# Patient Record
Sex: FEMALE | Race: BLACK OR AFRICAN AMERICAN | NOT HISPANIC OR LATINO | Employment: OTHER | URBAN - NONMETROPOLITAN AREA
[De-identification: names, ages, dates, MRNs, and addresses within clinical notes are randomized per-mention and may not be internally consistent; named-entity substitution may affect disease eponyms.]

---

## 2021-02-25 ENCOUNTER — HOSPITAL ENCOUNTER (EMERGENCY)
Facility: HOSPITAL | Age: 61
Discharge: PSYCHIATRIC OR SUBSTANCE USE FACILITY | End: 2021-02-25
Attending: EMERGENCY MEDICINE

## 2021-02-25 ENCOUNTER — HOSPITAL ENCOUNTER (INPATIENT)
Facility: HOSPITAL | Age: 61
LOS: 4 days | Discharge: HOME | End: 2021-03-01
Attending: PSYCHIATRY & NEUROLOGY | Admitting: PSYCHIATRY & NEUROLOGY

## 2021-02-25 VITALS
OXYGEN SATURATION: 100 % | WEIGHT: 293 LBS | DIASTOLIC BLOOD PRESSURE: 84 MMHG | RESPIRATION RATE: 18 BRPM | HEIGHT: 67 IN | HEART RATE: 82 BPM | BODY MASS INDEX: 45.99 KG/M2 | TEMPERATURE: 97.1 F | SYSTOLIC BLOOD PRESSURE: 160 MMHG

## 2021-02-25 DIAGNOSIS — R06.83 SNORING: Primary | ICD-10-CM

## 2021-02-25 DIAGNOSIS — R45.851 SUICIDAL IDEATION: Primary | ICD-10-CM

## 2021-02-25 DIAGNOSIS — R06.81 WITNESSED APNEIC SPELLS: ICD-10-CM

## 2021-02-25 DIAGNOSIS — F39 MOOD DISORDER (HCC): ICD-10-CM

## 2021-02-25 LAB
ALBUMIN SERPL-MCNC: 3.9 G/DL (ref 3.5–5.2)
ALBUMIN/GLOB SERPL: 1.3 G/DL
ALP SERPL-CCNC: 176 U/L (ref 39–117)
ALT SERPL W P-5'-P-CCNC: 10 U/L (ref 1–33)
AMPHET+METHAMPHET UR QL: NEGATIVE
AMPHETAMINES UR QL: NEGATIVE
ANION GAP SERPL CALCULATED.3IONS-SCNC: 9 MMOL/L (ref 5–15)
APAP SERPL-MCNC: <5 MCG/ML (ref 0–30)
AST SERPL-CCNC: 12 U/L (ref 1–32)
BARBITURATES UR QL SCN: NEGATIVE
BASOPHILS # BLD AUTO: 0.04 10*3/MM3 (ref 0–0.2)
BASOPHILS NFR BLD AUTO: 0.4 % (ref 0–1.5)
BENZODIAZ UR QL SCN: NEGATIVE
BILIRUB SERPL-MCNC: 0.2 MG/DL (ref 0–1.2)
BUN SERPL-MCNC: 8 MG/DL (ref 6–20)
BUN/CREAT SERPL: 9.2 (ref 7–25)
BUPRENORPHINE SERPL-MCNC: NEGATIVE NG/ML
CALCIUM SPEC-SCNC: 8.7 MG/DL (ref 8.6–10.5)
CANNABINOIDS SERPL QL: POSITIVE
CHLORIDE SERPL-SCNC: 107 MMOL/L (ref 98–107)
CO2 SERPL-SCNC: 26 MMOL/L (ref 22–29)
COCAINE UR QL: NEGATIVE
CREAT SERPL-MCNC: 0.87 MG/DL (ref 0.57–1)
DEPRECATED RDW RBC AUTO: 51.9 FL (ref 37–54)
EOSINOPHIL # BLD AUTO: 0.09 10*3/MM3 (ref 0–0.4)
EOSINOPHIL NFR BLD AUTO: 1 % (ref 0.3–6.2)
ERYTHROCYTE [DISTWIDTH] IN BLOOD BY AUTOMATED COUNT: 14.6 % (ref 12.3–15.4)
ETHANOL BLD-MCNC: <10 MG/DL (ref 0–10)
ETHANOL UR QL: <0.01 %
FLUAV RNA RESP QL NAA+PROBE: NOT DETECTED
FLUBV RNA RESP QL NAA+PROBE: NOT DETECTED
GFR SERPL CREATININE-BSD FRML MDRD: 81 ML/MIN/1.73
GLOBULIN UR ELPH-MCNC: 3.1 GM/DL
GLUCOSE SERPL-MCNC: 82 MG/DL (ref 65–99)
HCT VFR BLD AUTO: 43.2 % (ref 34–46.6)
HGB BLD-MCNC: 14.7 G/DL (ref 12–15.9)
HOLD SPECIMEN: NORMAL
HOLD SPECIMEN: NORMAL
IMM GRANULOCYTES # BLD AUTO: 0.01 10*3/MM3 (ref 0–0.05)
IMM GRANULOCYTES NFR BLD AUTO: 0.1 % (ref 0–0.5)
LYMPHOCYTES # BLD AUTO: 2.35 10*3/MM3 (ref 0.7–3.1)
LYMPHOCYTES NFR BLD AUTO: 26.4 % (ref 19.6–45.3)
MCH RBC QN AUTO: 32.8 PG (ref 26.6–33)
MCHC RBC AUTO-ENTMCNC: 34 G/DL (ref 31.5–35.7)
MCV RBC AUTO: 96.4 FL (ref 79–97)
METHADONE UR QL SCN: NEGATIVE
MONOCYTES # BLD AUTO: 0.57 10*3/MM3 (ref 0.1–0.9)
MONOCYTES NFR BLD AUTO: 6.4 % (ref 5–12)
NEUTROPHILS NFR BLD AUTO: 5.83 10*3/MM3 (ref 1.7–7)
NEUTROPHILS NFR BLD AUTO: 65.7 % (ref 42.7–76)
NRBC BLD AUTO-RTO: 0 /100 WBC (ref 0–0.2)
OPIATES UR QL: NEGATIVE
OXYCODONE UR QL SCN: NEGATIVE
PCP UR QL SCN: NEGATIVE
PLATELET # BLD AUTO: 220 10*3/MM3 (ref 140–450)
PMV BLD AUTO: 10.8 FL (ref 6–12)
POTASSIUM SERPL-SCNC: 3.7 MMOL/L (ref 3.5–5.2)
PROPOXYPH UR QL: NEGATIVE
PROT SERPL-MCNC: 7 G/DL (ref 6–8.5)
RBC # BLD AUTO: 4.48 10*6/MM3 (ref 3.77–5.28)
SALICYLATES SERPL-MCNC: <0.3 MG/DL
SARS-COV-2 RNA RESP QL NAA+PROBE: NOT DETECTED
SODIUM SERPL-SCNC: 142 MMOL/L (ref 136–145)
TRICYCLICS UR QL SCN: NEGATIVE
WBC # BLD AUTO: 8.89 10*3/MM3 (ref 3.4–10.8)
WHOLE BLOOD HOLD SPECIMEN: NORMAL
WHOLE BLOOD HOLD SPECIMEN: NORMAL

## 2021-02-25 PROCEDURE — 96372 THER/PROPH/DIAG INJ SC/IM: CPT

## 2021-02-25 PROCEDURE — 99283 EMERGENCY DEPT VISIT LOW MDM: CPT

## 2021-02-25 PROCEDURE — 25010000002 LORAZEPAM PER 2 MG: Performed by: EMERGENCY MEDICINE

## 2021-02-25 PROCEDURE — 80143 DRUG ASSAY ACETAMINOPHEN: CPT | Performed by: PHYSICIAN ASSISTANT

## 2021-02-25 PROCEDURE — 87636 SARSCOV2 & INF A&B AMP PRB: CPT | Performed by: PHYSICIAN ASSISTANT

## 2021-02-25 PROCEDURE — 25010000002 ZIPRASIDONE MESYLATE PER 10 MG: Performed by: PHYSICIAN ASSISTANT

## 2021-02-25 PROCEDURE — 80179 DRUG ASSAY SALICYLATE: CPT | Performed by: PHYSICIAN ASSISTANT

## 2021-02-25 PROCEDURE — 80053 COMPREHEN METABOLIC PANEL: CPT | Performed by: PHYSICIAN ASSISTANT

## 2021-02-25 PROCEDURE — 82077 ASSAY SPEC XCP UR&BREATH IA: CPT | Performed by: PHYSICIAN ASSISTANT

## 2021-02-25 PROCEDURE — 80306 DRUG TEST PRSMV INSTRMNT: CPT | Performed by: PHYSICIAN ASSISTANT

## 2021-02-25 PROCEDURE — 85025 COMPLETE CBC W/AUTO DIFF WBC: CPT | Performed by: PHYSICIAN ASSISTANT

## 2021-02-25 RX ORDER — AMLODIPINE BESYLATE 10 MG/1
10 TABLET ORAL DAILY
COMMUNITY

## 2021-02-25 RX ORDER — ONDANSETRON 4 MG/1
4 TABLET, ORALLY DISINTEGRATING ORAL EVERY 6 HOURS PRN
Status: DISCONTINUED | OUTPATIENT
Start: 2021-02-25 | End: 2021-03-01 | Stop reason: HOSPADM

## 2021-02-25 RX ORDER — ACETAMINOPHEN 325 MG/1
650 TABLET ORAL EVERY 4 HOURS PRN
Status: DISCONTINUED | OUTPATIENT
Start: 2021-02-25 | End: 2021-03-01 | Stop reason: HOSPADM

## 2021-02-25 RX ORDER — LOPERAMIDE HYDROCHLORIDE 2 MG/1
2 CAPSULE ORAL
Status: DISCONTINUED | OUTPATIENT
Start: 2021-02-25 | End: 2021-03-01 | Stop reason: HOSPADM

## 2021-02-25 RX ORDER — NICOTINE 21 MG/24HR
1 PATCH, TRANSDERMAL 24 HOURS TRANSDERMAL
Status: DISCONTINUED | OUTPATIENT
Start: 2021-02-26 | End: 2021-03-01 | Stop reason: HOSPADM

## 2021-02-25 RX ORDER — TRAZODONE HYDROCHLORIDE 50 MG/1
50 TABLET ORAL NIGHTLY PRN
Status: DISCONTINUED | OUTPATIENT
Start: 2021-02-25 | End: 2021-03-01 | Stop reason: HOSPADM

## 2021-02-25 RX ORDER — LORAZEPAM 2 MG/ML
1 INJECTION INTRAMUSCULAR ONCE
Status: COMPLETED | OUTPATIENT
Start: 2021-02-25 | End: 2021-02-25

## 2021-02-25 RX ORDER — CLONIDINE HYDROCHLORIDE 0.1 MG/1
0.1 TABLET ORAL EVERY 4 HOURS PRN
Status: DISCONTINUED | OUTPATIENT
Start: 2021-02-25 | End: 2021-03-01 | Stop reason: HOSPADM

## 2021-02-25 RX ORDER — ALUMINA, MAGNESIA, AND SIMETHICONE 2400; 2400; 240 MG/30ML; MG/30ML; MG/30ML
15 SUSPENSION ORAL EVERY 6 HOURS PRN
Status: DISCONTINUED | OUTPATIENT
Start: 2021-02-25 | End: 2021-03-01 | Stop reason: HOSPADM

## 2021-02-25 RX ORDER — ZIPRASIDONE MESYLATE 20 MG/ML
5 INJECTION, POWDER, LYOPHILIZED, FOR SOLUTION INTRAMUSCULAR ONCE
Status: COMPLETED | OUTPATIENT
Start: 2021-02-25 | End: 2021-02-25

## 2021-02-25 RX ADMIN — ZIPRASIDONE MESYLATE 5 MG: 20 INJECTION, POWDER, LYOPHILIZED, FOR SOLUTION INTRAMUSCULAR at 17:54

## 2021-02-25 RX ADMIN — LORAZEPAM 1 MG: 2 INJECTION INTRAMUSCULAR; INTRAVENOUS at 17:54

## 2021-02-26 LAB
CHOLEST SERPL-MCNC: 213 MG/DL (ref 0–200)
GLUCOSE P FAST SERPL-MCNC: 92 MG/DL (ref 74–106)
HDLC SERPL-MCNC: 34 MG/DL (ref 40–60)
LDLC SERPL CALC-MCNC: 155 MG/DL (ref 0–100)
LDLC/HDLC SERPL: 4.49 {RATIO}
TRIGL SERPL-MCNC: 131 MG/DL (ref 0–150)
VLDLC SERPL-MCNC: 24 MG/DL (ref 5–40)

## 2021-02-26 PROCEDURE — 82947 ASSAY GLUCOSE BLOOD QUANT: CPT | Performed by: PSYCHIATRY & NEUROLOGY

## 2021-02-26 PROCEDURE — 90833 PSYTX W PT W E/M 30 MIN: CPT | Performed by: PSYCHIATRY & NEUROLOGY

## 2021-02-26 PROCEDURE — 99223 1ST HOSP IP/OBS HIGH 75: CPT | Performed by: PSYCHIATRY & NEUROLOGY

## 2021-02-26 PROCEDURE — 80061 LIPID PANEL: CPT | Performed by: PSYCHIATRY & NEUROLOGY

## 2021-02-26 RX ORDER — AMLODIPINE BESYLATE 10 MG/1
10 TABLET ORAL EVERY EVENING
Status: DISCONTINUED | OUTPATIENT
Start: 2021-02-26 | End: 2021-03-01 | Stop reason: HOSPADM

## 2021-02-26 RX ADMIN — AMLODIPINE BESYLATE 10 MG: 10 TABLET ORAL at 20:40

## 2021-02-26 RX ADMIN — TRAZODONE HYDROCHLORIDE 50 MG: 50 TABLET ORAL at 23:03

## 2021-02-27 PROBLEM — I10 BENIGN ESSENTIAL HTN: Status: ACTIVE | Noted: 2021-02-27

## 2021-02-27 PROBLEM — F19.94 SUBSTANCE INDUCED MOOD DISORDER: Status: ACTIVE | Noted: 2021-02-27

## 2021-02-27 PROBLEM — F43.10 POST TRAUMATIC STRESS DISORDER (PTSD): Status: ACTIVE | Noted: 2021-02-27

## 2021-02-27 PROBLEM — F39 MOOD DISORDER: Status: ACTIVE | Noted: 2021-02-27

## 2021-02-27 PROBLEM — R45.89 INEFFECTIVE COPING: Status: ACTIVE | Noted: 2021-02-27

## 2021-02-27 PROBLEM — F12.20 TETRAHYDROCANNABINOL (THC) USE DISORDER, MODERATE, DEPENDENCE (HCC): Status: ACTIVE | Noted: 2021-02-27

## 2021-02-27 LAB
25(OH)D3 SERPL-MCNC: 9.9 NG/ML (ref 30–100)
FOLATE SERPL-MCNC: 4.62 NG/ML (ref 4.78–24.2)
TSH SERPL DL<=0.05 MIU/L-ACNC: 1.92 UIU/ML (ref 0.27–4.2)
VIT B12 BLD-MCNC: 242 PG/ML (ref 211–946)
WHOLE BLOOD HOLD SPECIMEN: NORMAL

## 2021-02-27 PROCEDURE — 82306 VITAMIN D 25 HYDROXY: CPT | Performed by: PSYCHIATRY & NEUROLOGY

## 2021-02-27 PROCEDURE — 82746 ASSAY OF FOLIC ACID SERUM: CPT | Performed by: PSYCHIATRY & NEUROLOGY

## 2021-02-27 PROCEDURE — 82607 VITAMIN B-12: CPT | Performed by: PSYCHIATRY & NEUROLOGY

## 2021-02-27 PROCEDURE — 90833 PSYTX W PT W E/M 30 MIN: CPT | Performed by: PSYCHIATRY & NEUROLOGY

## 2021-02-27 PROCEDURE — 99232 SBSQ HOSP IP/OBS MODERATE 35: CPT | Performed by: PSYCHIATRY & NEUROLOGY

## 2021-02-27 PROCEDURE — 84443 ASSAY THYROID STIM HORMONE: CPT | Performed by: PSYCHIATRY & NEUROLOGY

## 2021-02-27 RX ORDER — MIRTAZAPINE 15 MG/1
15 TABLET, FILM COATED ORAL NIGHTLY
Status: DISCONTINUED | OUTPATIENT
Start: 2021-02-27 | End: 2021-03-01 | Stop reason: HOSPADM

## 2021-02-27 RX ADMIN — AMLODIPINE BESYLATE 10 MG: 10 TABLET ORAL at 17:21

## 2021-02-27 RX ADMIN — MIRTAZAPINE 15 MG: 15 TABLET, FILM COATED ORAL at 20:31

## 2021-02-28 PROCEDURE — 99232 SBSQ HOSP IP/OBS MODERATE 35: CPT | Performed by: PSYCHIATRY & NEUROLOGY

## 2021-02-28 PROCEDURE — 90833 PSYTX W PT W E/M 30 MIN: CPT | Performed by: PSYCHIATRY & NEUROLOGY

## 2021-02-28 PROCEDURE — 25010000002 CYANOCOBALAMIN PER 1000 MCG: Performed by: PSYCHIATRY & NEUROLOGY

## 2021-02-28 RX ORDER — CHOLECALCIFEROL (VITAMIN D3) 1250 MCG
50000 CAPSULE ORAL ONCE
Status: COMPLETED | OUTPATIENT
Start: 2021-02-28 | End: 2021-02-28

## 2021-02-28 RX ORDER — FOLIC ACID 1 MG/1
1 TABLET ORAL DAILY
Status: DISCONTINUED | OUTPATIENT
Start: 2021-02-28 | End: 2021-03-01 | Stop reason: HOSPADM

## 2021-02-28 RX ORDER — CYANOCOBALAMIN 1000 UG/ML
1000 INJECTION, SOLUTION INTRAMUSCULAR; SUBCUTANEOUS DAILY
Status: COMPLETED | OUTPATIENT
Start: 2021-02-28 | End: 2021-03-01

## 2021-02-28 RX ORDER — LANOLIN ALCOHOL/MO/W.PET/CERES
1000 CREAM (GRAM) TOPICAL DAILY
Status: DISCONTINUED | OUTPATIENT
Start: 2021-03-02 | End: 2021-03-01 | Stop reason: HOSPADM

## 2021-02-28 RX ORDER — MELATONIN
2000 DAILY
Status: DISCONTINUED | OUTPATIENT
Start: 2021-03-01 | End: 2021-03-01

## 2021-02-28 RX ADMIN — CYANOCOBALAMIN 1000 MCG: 1000 INJECTION, SOLUTION INTRAMUSCULAR at 12:27

## 2021-02-28 RX ADMIN — AMLODIPINE BESYLATE 10 MG: 10 TABLET ORAL at 17:08

## 2021-02-28 RX ADMIN — FOLIC ACID 1 MG: 1 TABLET ORAL at 12:27

## 2021-02-28 RX ADMIN — MIRTAZAPINE 15 MG: 15 TABLET, FILM COATED ORAL at 20:19

## 2021-02-28 RX ADMIN — OFLOXACIN 50000 UNITS: 300 TABLET, COATED ORAL at 12:27

## 2021-03-01 VITALS
DIASTOLIC BLOOD PRESSURE: 74 MMHG | HEIGHT: 67 IN | BODY MASS INDEX: 45.99 KG/M2 | HEART RATE: 77 BPM | OXYGEN SATURATION: 98 % | RESPIRATION RATE: 17 BRPM | WEIGHT: 293 LBS | TEMPERATURE: 97.1 F | SYSTOLIC BLOOD PRESSURE: 154 MMHG

## 2021-03-01 PROBLEM — F19.94 SUBSTANCE INDUCED MOOD DISORDER: Status: RESOLVED | Noted: 2021-02-27 | Resolved: 2021-03-01

## 2021-03-01 PROBLEM — R45.851 SUICIDAL IDEATION: Status: RESOLVED | Noted: 2021-02-25 | Resolved: 2021-03-01

## 2021-03-01 PROCEDURE — 99239 HOSP IP/OBS DSCHRG MGMT >30: CPT | Performed by: PSYCHIATRY & NEUROLOGY

## 2021-03-01 PROCEDURE — 25010000002 CYANOCOBALAMIN PER 1000 MCG: Performed by: PSYCHIATRY & NEUROLOGY

## 2021-03-01 RX ORDER — CHOLECALCIFEROL (VITAMIN D3) 1250 MCG
50000 CAPSULE ORAL
Status: DISCONTINUED | OUTPATIENT
Start: 2021-03-07 | End: 2021-03-01 | Stop reason: HOSPADM

## 2021-03-01 RX ORDER — CHOLECALCIFEROL (VITAMIN D3) 1250 MCG
50000 CAPSULE ORAL
Qty: 5 CAPSULE | Refills: 1 | Status: SHIPPED | OUTPATIENT
Start: 2021-03-07 | End: 2022-05-09 | Stop reason: HOSPADM

## 2021-03-01 RX ORDER — MIRTAZAPINE 15 MG/1
15 TABLET, FILM COATED ORAL NIGHTLY
Qty: 30 TABLET | Refills: 1 | Status: SHIPPED | OUTPATIENT
Start: 2021-03-01 | End: 2022-05-09 | Stop reason: HOSPADM

## 2021-03-01 RX ORDER — FOLIC ACID 1 MG/1
1 TABLET ORAL DAILY
Qty: 30 TABLET | Refills: 1 | Status: SHIPPED | OUTPATIENT
Start: 2021-03-02 | End: 2022-05-09 | Stop reason: HOSPADM

## 2021-03-01 RX ADMIN — CYANOCOBALAMIN 1000 MCG: 1000 INJECTION, SOLUTION INTRAMUSCULAR at 08:21

## 2021-03-01 RX ADMIN — FOLIC ACID 1 MG: 1 TABLET ORAL at 08:20

## 2021-03-01 RX ADMIN — Medication 2000 UNITS: at 08:20

## 2021-03-01 NOTE — SIGNIFICANT NOTE
Undersigned met with Talya 1:1 this date to discuss discharge and safety planning. Talya reports feeling well about discharge and denies any concerns.She is apologetic about behaviors previous week. She was informed of follow up services at [facility] and oriented to open access services. Desires to continue services with current PCP- polyigned attempted to make contact with this facility and was unable to do so. Talya was instructed to follow up for care at provider desire upon d/c. Talya's  will be transporting her home this date. She appears stable to d/c at this time. Oriented to crisis hotline.
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
## 2022-05-09 ENCOUNTER — OFFICE VISIT (OUTPATIENT)
Dept: OBSTETRICS AND GYNECOLOGY | Facility: CLINIC | Age: 62
End: 2022-05-09

## 2022-05-09 ENCOUNTER — LAB (OUTPATIENT)
Dept: LAB | Facility: HOSPITAL | Age: 62
End: 2022-05-09

## 2022-05-09 VITALS
BODY MASS INDEX: 45.99 KG/M2 | WEIGHT: 293 LBS | SYSTOLIC BLOOD PRESSURE: 136 MMHG | DIASTOLIC BLOOD PRESSURE: 84 MMHG | HEIGHT: 67 IN

## 2022-05-09 DIAGNOSIS — N95.0 POST-MENOPAUSAL BLEEDING: Primary | ICD-10-CM

## 2022-05-09 DIAGNOSIS — Z32.00 PREGNANCY EXAMINATION OR TEST, PREGNANCY UNCONFIRMED: ICD-10-CM

## 2022-05-09 DIAGNOSIS — Z87.42 HISTORY OF ENDOMETRIAL HYPERPLASIA: ICD-10-CM

## 2022-05-09 LAB
B-HCG UR QL: NEGATIVE
EXPIRATION DATE: 0
INTERNAL NEGATIVE CONTROL: NEGATIVE
INTERNAL POSITIVE CONTROL: POSITIVE
Lab: 0

## 2022-05-09 PROCEDURE — 88305 TISSUE EXAM BY PATHOLOGIST: CPT

## 2022-05-09 PROCEDURE — 58100 BIOPSY OF UTERUS LINING: CPT | Performed by: NURSE PRACTITIONER

## 2022-05-09 PROCEDURE — 99203 OFFICE O/P NEW LOW 30 MIN: CPT | Performed by: NURSE PRACTITIONER

## 2022-05-09 PROCEDURE — 81025 URINE PREGNANCY TEST: CPT | Performed by: NURSE PRACTITIONER

## 2022-05-09 RX ORDER — LOSARTAN POTASSIUM 25 MG/1
25 TABLET ORAL DAILY
COMMUNITY
Start: 2022-03-31

## 2022-05-09 NOTE — PROGRESS NOTES
Subjective   Talya Schumacher is a 61 y.o. desires a hysterectomy    Talya Magaña is a 61 yr old postmenopausal . Referred by her PCP, Dr. Montanez for endometrial hyperplasia. Pt voices that she wants a hysterectomy because of the bleeding she has. Does not want to go to [facility] because she wants a female provider. She state she has an episode of spotting that occurs once a month although does not sure if occurs around the same time; mainly notices when she is wiping after using the restroom. States she had gone through menopause at age 51 then had spotting and vaginal bleeding from age [age] and was seen in [place] for this concern. Was told that it was related to her uterine fibroids and states she was given progesterone pills for the bleeding but pt this led to heavier bleeding and cramping. Was also seen at [facility] for vaginal bleeding in  but no records are available. [facility] Pt also states she had a Pelvic CT scan recently ordered by her PCP, but no records are available today and pt unsure of the results.       The following portions of the patient's history were reviewed and updated as appropriate: allergies, current medications, past family history, past medical history, past social history, past surgical history and problem list.    Review of Systems   Constitutional: Negative for chills, fatigue, fever, unexpected weight gain and unexpected weight loss.   Respiratory: Negative for shortness of breath.    Cardiovascular: Negative for chest pain and palpitations.   Gastrointestinal: Negative for abdominal pain, constipation, diarrhea and nausea.   Endocrine: Negative for cold intolerance and heat intolerance.   Genitourinary: Positive for vaginal bleeding (spotting). Negative for amenorrhea, breast discharge, breast lump, breast pain, difficulty urinating, dysuria, frequency, menstrual problem, pelvic pain, pelvic pressure, urinary  incontinence, vaginal discharge and vaginal pain.   Skin: Negative for rash.   Neurological: Negative for headache.   Psychiatric/Behavioral: Negative for sleep disturbance, depressed mood and stress.       Objective   Physical Exam  Vitals and nursing note reviewed. Exam conducted with a chaperone present.   Constitutional:       Appearance: Normal appearance. She is obese.   Pulmonary:      Effort: Pulmonary effort is normal.   Genitourinary:     General: Normal vulva.      Cervix: Normal.      Rectum: Normal.      Comments: EMB collected.  Neurological:      Mental Status: She is alert.   Psychiatric:         Mood and Affect: Mood normal.         Behavior: Behavior normal.           Assessment/Plan   Diagnoses and all orders for this visit:    1. Post-menopausal bleeding (Primary)  -     US Non-ob Transvaginal; Future  -     Cancel: Follicle Stimulating Hormone  -     Cancel: TISSUE EXAM, [facility] ([facility])  -     Tissue Pathology Exam    2. History of endometrial hyperplasia  -     Cancel: Follicle Stimulating Hormone  -     Cancel: TISSUE EXAM, [facility] ([facility])  -     Tissue Pathology Exam    3. Pregnancy examination or test, pregnancy unconfirmed  -     POC Pregnancy, Urine        Counseled pt on PMB work-up and recommend EMB to rule-out uterine hyperplasia and malignancy; call pt with results. Discussed recommendation for a pelvic U/S and surgical consultation with one of our physicians in [place] in 1-2 weeks. Requested previous records from her PCP and Sophie Garcia.         Endometrial Biopsy    Date of procedure:  5/9/2022    Procedure documentation:    The cervix was grasped anterior at the 1 o'clock position.  The cavity sounded to 9 centimeters.  An endometrial biopsy specimen was obtained. The tissue was sent for permanent histopathologic evaluation.  Tenaculum was removed from the cervix with scant bleeding. She tolerated the procedure well.    Post procedure instructions:She was  instructed to call us in 1 week's time if she has not heard from us otherwise.  If there is any significant fever or excessive bleeding or pain she is to call immediately.      This note was electronically signed.    Sarina Gudino, APRN  May 9, 2022

## 2022-05-11 LAB
LAB AP CASE REPORT: NORMAL
PATH REPORT.FINAL DX SPEC: NORMAL

## 2022-05-12 ENCOUNTER — TELEPHONE (OUTPATIENT)
Dept: OBSTETRICS AND GYNECOLOGY | Facility: CLINIC | Age: 62
End: 2022-05-12

## 2022-05-31 ENCOUNTER — OFFICE VISIT (OUTPATIENT)
Dept: OBSTETRICS AND GYNECOLOGY | Facility: CLINIC | Age: 62
End: 2022-05-31

## 2022-05-31 VITALS
DIASTOLIC BLOOD PRESSURE: 68 MMHG | WEIGHT: 293 LBS | SYSTOLIC BLOOD PRESSURE: 130 MMHG | BODY MASS INDEX: 45.99 KG/M2 | HEIGHT: 67 IN

## 2022-05-31 DIAGNOSIS — N95.0 PMB (POSTMENOPAUSAL BLEEDING): Primary | ICD-10-CM

## 2022-05-31 DIAGNOSIS — D25.9 UTERINE LEIOMYOMA, UNSPECIFIED LOCATION: ICD-10-CM

## 2022-05-31 DIAGNOSIS — R93.89 THICKENED ENDOMETRIUM: ICD-10-CM

## 2022-05-31 PROCEDURE — 99214 OFFICE O/P EST MOD 30 MIN: CPT | Performed by: STUDENT IN AN ORGANIZED HEALTH CARE EDUCATION/TRAINING PROGRAM

## 2022-05-31 RX ORDER — CEFAZOLIN SODIUM IN 0.9 % NACL 3 G/100 ML
3 INTRAVENOUS SOLUTION, PIGGYBACK (ML) INTRAVENOUS ONCE
Status: CANCELLED | OUTPATIENT
Start: 2022-07-07 | End: 2022-05-31

## 2022-05-31 RX ORDER — SODIUM CHLORIDE, SODIUM LACTATE, POTASSIUM CHLORIDE, CALCIUM CHLORIDE 600; 310; 30; 20 MG/100ML; MG/100ML; MG/100ML; MG/100ML
125 INJECTION, SOLUTION INTRAVENOUS CONTINUOUS
Status: CANCELLED | OUTPATIENT
Start: 2022-07-07

## 2022-06-01 PROBLEM — N95.0 PMB (POSTMENOPAUSAL BLEEDING): Status: ACTIVE | Noted: 2022-06-01

## 2022-07-05 ENCOUNTER — PRE-ADMISSION TESTING (OUTPATIENT)
Dept: PREADMISSION TESTING | Facility: HOSPITAL | Age: 62
End: 2022-07-05

## 2022-07-05 ENCOUNTER — LAB (OUTPATIENT)
Dept: LAB | Facility: HOSPITAL | Age: 62
End: 2022-07-05

## 2022-07-05 ENCOUNTER — OFFICE VISIT (OUTPATIENT)
Dept: OBSTETRICS AND GYNECOLOGY | Facility: CLINIC | Age: 62
End: 2022-07-05

## 2022-07-05 VITALS
BODY MASS INDEX: 45.99 KG/M2 | DIASTOLIC BLOOD PRESSURE: 76 MMHG | SYSTOLIC BLOOD PRESSURE: 132 MMHG | WEIGHT: 293 LBS | HEIGHT: 67 IN

## 2022-07-05 VITALS
HEART RATE: 88 BPM | BODY MASS INDEX: 45.99 KG/M2 | WEIGHT: 293 LBS | RESPIRATION RATE: 18 BRPM | OXYGEN SATURATION: 97 % | HEIGHT: 67 IN

## 2022-07-05 DIAGNOSIS — D25.9 UTERINE LEIOMYOMA, UNSPECIFIED LOCATION: ICD-10-CM

## 2022-07-05 DIAGNOSIS — N95.0 PMB (POSTMENOPAUSAL BLEEDING): Primary | ICD-10-CM

## 2022-07-05 DIAGNOSIS — N95.0 PMB (POSTMENOPAUSAL BLEEDING): ICD-10-CM

## 2022-07-05 LAB
ABO GROUP BLD: NORMAL
ANION GAP SERPL CALCULATED.3IONS-SCNC: 9 MMOL/L (ref 5–15)
BASOPHILS # BLD AUTO: 0.03 10*3/MM3 (ref 0–0.2)
BASOPHILS NFR BLD AUTO: 0.3 % (ref 0–1.5)
BLD GP AB SCN SERPL QL: NEGATIVE
BUN SERPL-MCNC: 7 MG/DL (ref 6–20)
BUN/CREAT SERPL: 8.3 (ref 7–25)
CALCIUM SPEC-SCNC: 9 MG/DL (ref 8.6–10.5)
CHLORIDE SERPL-SCNC: 105 MMOL/L (ref 98–107)
CO2 SERPL-SCNC: 25 MMOL/L (ref 22–29)
CREAT SERPL-MCNC: 0.84 MG/DL (ref 0.57–1)
DEPRECATED RDW RBC AUTO: 50.4 FL (ref 37–54)
EGFRCR SERPLBLD CKD-EPI 2021: 80.2 ML/MIN/1.73
EOSINOPHIL # BLD AUTO: 0.1 10*3/MM3 (ref 0–0.4)
EOSINOPHIL NFR BLD AUTO: 1 % (ref 0.3–6.2)
ERYTHROCYTE [DISTWIDTH] IN BLOOD BY AUTOMATED COUNT: 14.5 % (ref 12.3–15.4)
GLUCOSE SERPL-MCNC: 96 MG/DL (ref 65–99)
HCT VFR BLD AUTO: 40.2 % (ref 34–46.6)
HGB BLD-MCNC: 13.9 G/DL (ref 12–15.9)
IMM GRANULOCYTES # BLD AUTO: 0.04 10*3/MM3 (ref 0–0.05)
IMM GRANULOCYTES NFR BLD AUTO: 0.4 % (ref 0–0.5)
LYMPHOCYTES # BLD AUTO: 1.95 10*3/MM3 (ref 0.7–3.1)
LYMPHOCYTES NFR BLD AUTO: 19.2 % (ref 19.6–45.3)
Lab: NORMAL
MCH RBC QN AUTO: 32.5 PG (ref 26.6–33)
MCHC RBC AUTO-ENTMCNC: 34.6 G/DL (ref 31.5–35.7)
MCV RBC AUTO: 93.9 FL (ref 79–97)
MONOCYTES # BLD AUTO: 0.63 10*3/MM3 (ref 0.1–0.9)
MONOCYTES NFR BLD AUTO: 6.2 % (ref 5–12)
NEUTROPHILS NFR BLD AUTO: 7.38 10*3/MM3 (ref 1.7–7)
NEUTROPHILS NFR BLD AUTO: 72.9 % (ref 42.7–76)
NRBC BLD AUTO-RTO: 0 /100 WBC (ref 0–0.2)
PLATELET # BLD AUTO: 237 10*3/MM3 (ref 140–450)
PMV BLD AUTO: 11.3 FL (ref 6–12)
POTASSIUM SERPL-SCNC: 3.4 MMOL/L (ref 3.5–5.2)
RBC # BLD AUTO: 4.28 10*6/MM3 (ref 3.77–5.28)
RH BLD: POSITIVE
SARS-COV-2 N GENE RESP QL NAA+PROBE: NOT DETECTED
SODIUM SERPL-SCNC: 139 MMOL/L (ref 136–145)
T&S EXPIRATION DATE: NORMAL
WBC NRBC COR # BLD: 10.13 10*3/MM3 (ref 3.4–10.8)

## 2022-07-05 PROCEDURE — C9803 HOPD COVID-19 SPEC COLLECT: HCPCS

## 2022-07-05 PROCEDURE — 87635 SARS-COV-2 COVID-19 AMP PRB: CPT

## 2022-07-05 PROCEDURE — 86901 BLOOD TYPING SEROLOGIC RH(D): CPT

## 2022-07-05 PROCEDURE — 80048 BASIC METABOLIC PNL TOTAL CA: CPT

## 2022-07-05 PROCEDURE — 86900 BLOOD TYPING SEROLOGIC ABO: CPT

## 2022-07-05 PROCEDURE — 86850 RBC ANTIBODY SCREEN: CPT

## 2022-07-05 PROCEDURE — 93005 ELECTROCARDIOGRAM TRACING: CPT

## 2022-07-05 PROCEDURE — 36415 COLL VENOUS BLD VENIPUNCTURE: CPT

## 2022-07-05 PROCEDURE — 93010 ELECTROCARDIOGRAM REPORT: CPT | Performed by: INTERNAL MEDICINE

## 2022-07-05 PROCEDURE — 85025 COMPLETE CBC W/AUTO DIFF WBC: CPT

## 2022-07-05 PROCEDURE — 99214 OFFICE O/P EST MOD 30 MIN: CPT | Performed by: STUDENT IN AN ORGANIZED HEALTH CARE EDUCATION/TRAINING PROGRAM

## 2022-07-05 NOTE — H&P (VIEW-ONLY)
[facility]  HISTORY & PHYSICAL - Gynecology    Name: Talya Schumacher  MRN: [id number]  Location: Room/bed info not found  Date: 2022  CSN: [id number]      CHIEF COMPLAINT: Preop appointment    HISTORY OF PRESENT ILLNESS  Talya Schumacher is a 61 y.o.  postmenopausal female who presents with complaints of postmenopausal bleeding, desiring definitive management with hysterectomy with negative work-up.      Patient reports Menopause at 50 yo. Had spotting and bleeding from [age] yo. Seen in [place] and given progesterone for fibroids but states this made her bleeding heavier and had more cramping. States trialed vaginal and oral preparations and bled whole month. Reports seen  at [facility] but did not pursue additional treatment at that time.      Saw Sarina Ch, APRN 22. Had reported not wanting surgery with [facility] due to wanting female provider. Records requested.  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 developmental cystic remnant possible).     22 GYN US   IMPRESSION:  1. Thickened endometrium as above with history of postmenopausal  bleeding would warrant a follow-up endometrial biopsy.    2. No free fluid in the cul-de-sac.  3. Right ovary not identified on this exam.  4. Heterogeneous echotexture of the uterus without focal  parenchymal abnormality.     Says sees Dr. Montanez at [facility]. Will request records.    Denies any significant vaginal bleeding since she was last seen in the clinic.  Reports occasional pink spotting.  Denies any significant abdominal pain.    Patient denies any chest pain, palpitations, headaches, lightheadedness, shortness of breath, cough, nausea, vomiting, diarrhea, constipation, fever, or chills.     ROS  Review of Systems   Constitutional: Negative.    HENT: Negative.   "  Respiratory: Negative.    Cardiovascular: Negative.    Gastrointestinal: Negative.    Genitourinary: Positive for vaginal bleeding.   Musculoskeletal: Negative.    Skin: Negative.    Psychiatric/Behavioral: Negative.        OBSTETRIC HISTORY  OB History    Para Term  AB Living   10 6 6   4 7   SAB IAB Ectopic Molar Multiple Live Births           1 7      # Outcome Date GA Lbr Talha/2nd Weight Sex Delivery Anes PTL Lv   10 AB            9 AB            8 AB            7 AB            6A Term      Vag-Spont   SUSHMA   6B Term      Vag-Spont   SUSHMA   5 Term      Vag-Spont   SUSHMA   4 Term      Vag-Spont   SUSHMA   3 Term      Vag-Spont   SUSHMA   2 Term      Vag-Spont   SUSHMA   1 Term      Vag-Spont   SUSHMA     GYN HISTORY  Menopause 51  54 yo started bleeding again, sometimes spotting when wiping, sometimes cramping and bleeding like period  History of STIs: CT, s/p tx; states in hospital for one week on IV abx in   Last pap smear: [facility]       Abnormal pap smear history: ASCUS/hpv neg  Contraception: denies    PAST MEDICAL HISTORY  Past Medical History:   Diagnosis Date   • Class 3 severe obesity with body mass index (BMI) of 45.0 to 49.9 in adult (HCC)    • Hypertension      PAST SURGICAL HISTORY  Past Surgical History:   Procedure Laterality Date   • COLONOSCOPY     • DILATATION AND CURETTAGE      x3 surgical    • JOINT REPLACEMENT     • LAPAROSCOPIC CHOLECYSTECTOMY     • POSTPARTUM TUBAL LIGATION     • UMBILICAL HERNIA REPAIR      unsure mesh, \"gave me a belly button\", was child     FAMILY HISTORY  No family history on file.  SOCIAL HISTORY  Social History     Socioeconomic History   • Marital status:    Tobacco Use   • Smoking status: Current Every Day Smoker     Packs/day: 0.50     Types: Cigarettes   • Smokeless tobacco: Never Used   Substance and Sexual Activity   • Alcohol use: Yes     Comment: <1 drink per week   • Drug use: Yes     Types: Marijuana     Comment: NIGHTLY FOR SLEEP PER " PATIENT   • Sexual activity: Yes     Partners: Male     Birth control/protection: Surgical     ALLERGIES  Allergies   Allergen Reactions   • Contrast Dye Rash     HOME MEDICATIONS  Prior to Admission medications    Medication Sig Start Date End Date Taking? Authorizing Provider   amLODIPine (NORVASC) 10 MG tablet Take 10 mg by mouth Daily.    ProviderMarquis MD   losartan (COZAAR) 25 MG tablet Take 25 mg by mouth Daily. 3/31/22   ProviderMarquis MD     PHYSICAL EXAM  /76  BMI 46    General: No acute distress.  Well nourished.  Alert and oriented x 3.  Heart:  RRR, no murmurs, rubs, or gallops  Lungs: CTAB.  No wheezes, rales, or rhonchi.  Abdomen: Soft. Nontender.  No rebound or guarding.  5/31/22 PELVIC EXAM:  External Genitalia/Vulva: Anatomy is normal, no significant redness of labia, no discharge on vulvar tissues, Allouez's and Bartholin's glands are normal, no ulcers, no condylomatous lesions. Postmenopausal atrophy without lesions or other skin changes.  Urethral meatus: Normal, no lesions, no prolapse.  Urethra: Normal, no masses, no tenderness with palpation.  Bladder: Normal, no fullness, no masses, no tenderness with palpation.  Vagina: Vaginal tissues are not inflamed, normal color and texture, no significant discharge present.  Pelvic support adequate.  Cervix: Normal, no lesions, no purulent discharge, no cervical motion tenderness.  Uterus: Normal size, shape, and consistency.  Good mobility noted.  Minimal descent noted with good support.  Adnexa: Normal size and shape bilaterally, no palpable mass bilaterally and non-tender bilaterally.  Rectal: NERIS deferred.    LABS  WBC   Date Value Ref Range Status   07/05/2022 10.13 3.40 - 10.80 10*3/mm3 Final     RBC   Date Value Ref Range Status   07/05/2022 4.28 3.77 - 5.28 10*6/mm3 Final     Hemoglobin   Date Value Ref Range Status   07/05/2022 13.9 12.0 - 15.9 g/dL Final     Hematocrit   Date Value Ref Range Status   07/05/2022 40.2 34.0 -  46.6 % Final     MCV   Date Value Ref Range Status   07/05/2022 93.9 79.0 - 97.0 fL Final     MCH   Date Value Ref Range Status   07/05/2022 32.5 26.6 - 33.0 pg Final     MCHC   Date Value Ref Range Status   07/05/2022 34.6 31.5 - 35.7 g/dL Final     RDW   Date Value Ref Range Status   07/05/2022 14.5 12.3 - 15.4 % Final     RDW-SD   Date Value Ref Range Status   07/05/2022 50.4 37.0 - 54.0 fl Final     MPV   Date Value Ref Range Status   07/05/2022 11.3 6.0 - 12.0 fL Final     Platelets   Date Value Ref Range Status   07/05/2022 237 140 - 450 10*3/mm3 Final     Neutrophil %   Date Value Ref Range Status   07/05/2022 72.9 42.7 - 76.0 % Final     Lymphocyte %   Date Value Ref Range Status   07/05/2022 19.2 (L) 19.6 - 45.3 % Final     Monocyte %   Date Value Ref Range Status   07/05/2022 6.2 5.0 - 12.0 % Final     Eosinophil %   Date Value Ref Range Status   07/05/2022 1.0 0.3 - 6.2 % Final     Basophil %   Date Value Ref Range Status   07/05/2022 0.3 0.0 - 1.5 % Final     Immature Grans %   Date Value Ref Range Status   07/05/2022 0.4 0.0 - 0.5 % Final     Neutrophils, Absolute   Date Value Ref Range Status   07/05/2022 7.38 (H) 1.70 - 7.00 10*3/mm3 Final     Lymphocytes, Absolute   Date Value Ref Range Status   07/05/2022 1.95 0.70 - 3.10 10*3/mm3 Final     Monocytes, Absolute   Date Value Ref Range Status   07/05/2022 0.63 0.10 - 0.90 10*3/mm3 Final     Eosinophils, Absolute   Date Value Ref Range Status   07/05/2022 0.10 0.00 - 0.40 10*3/mm3 Final     Basophils, Absolute   Date Value Ref Range Status   07/05/2022 0.03 0.00 - 0.20 10*3/mm3 Final     Immature Grans, Absolute   Date Value Ref Range Status   07/05/2022 0.04 0.00 - 0.05 10*3/mm3 Final     nRBC   Date Value Ref Range Status   07/05/2022 0.0 0.0 - 0.2 /100 WBC Final     Lab Results   Component Value Date    GLUCOSE 96 07/05/2022    BUN 7 07/05/2022    CREATININE 0.84 07/05/2022    EGFRIFAFRI 81 02/25/2021    BCR 8.3 07/05/2022    K 3.4 (L) 07/05/2022     CO2 25.0 2022    CALCIUM 9.0 2022    ALBUMIN 3.90 2021    AST 12 2021    ALT 10 2021         IMAGING  22 Uterus 8.09 x 4.28 x 5.09 cm, ES 6.7 mm, heterogeoneous uterus, probable small fibroids, ROV not visualized, LOV 1.75 x 1.59 x 1.52 cm  US Non-ob Transvaginal  1. Thickened endometrium as above with history of postmenopausal bleeding would warrant a follow-up endometrial biopsy.  2. No free fluid in the cul-de-sac. 3. Right ovary not identified on this exam. 4. Heterogeneous echotexture of the uterus without focal parenchymal abnormality.  Electronically signed by:  Sylvester Mejia MD  2022 10:58 AM CDT Workstation: [id number]      IMPRESSION  Talya Schumacher is a 61 y.o.  presenting with desire for definitive management of PMB suspected related to AUB-L, failed medical management with oral progesterone.    PLAN  1. PMB (postmenopausal bleeding)  - Intermittent PMB, suspect due to fibroid uterus discussed previously  - EMB negative: discussed previously with patient risk of occult atypia/hyperplasia/malignancy as EMB with 45% sensitivity for disease, 100% sensitivity for cancer; 98-99% specificity; discussed could perform Hsc D&C for further sampling vs. Plan for hysterectomy and BSO  - Discussed options include progesterone therapy +/- Hsc (reports failing Progesterone therapy previously), Hsc D&C and IUD placement, endometrial ablation, or hysterectomy and r/b of each  - Patient strongly desires definitive management with hysterectomy  - Discussed with morbid obesity there is risk of needing to perform HUDSON if anesthesia unable to ventilate with Trendelenburg positioning  - Would plan for entry at [place] due to prior umbilical hernia repair  - Will plan for TLH, BSO, Cysto, possible HUDSON if bleeding, scar tissue, ventilation issues or other surgical indication procludes laparoscopic procedure  - Discussed DC home POD#0 if uncomplicated TLH, typically POD#1-2 if  HUDSON pending recovery  - Discussed risk of worsening LAURI after hysterectomy and offered Urology referral, but patient declines at this time  - Discussed risks/benefits of surgery: risk of infection, bleeding, damage to surrounding structures, perforation, need for additional procedures; discussed risk of anesthesia including heart attack, stroke, and death; required COVID screening and plan for outpatient surgery discussed.  - Reviewed plan of care, surgical plan again today and patient desires to proceed  - Records from [facility] reviewed          This document has been electronically signed by Lucia Golden DO on July 5, 2022 10:44 CDT

## 2022-07-05 NOTE — PROGRESS NOTES
[facility]  HISTORY & PHYSICAL - Gynecology    Name: Talya Schumacher  MRN: [id number]  Location: Room/bed info not found  Date: 2022  CSN: [id number]      CHIEF COMPLAINT: Preop appointment    HISTORY OF PRESENT ILLNESS  Talya Schumacher is a 61 y.o.  postmenopausal female who presents with complaints of postmenopausal bleeding, desiring definitive management with hysterectomy with negative work-up.      Patient reports Menopause at 50 yo. Had spotting and bleeding from [age] yo. Seen in [place] and given progesterone for fibroids but states this made her bleeding heavier and had more cramping. States trialed vaginal and oral preparations and bled whole month. Reports seen  at [facility] but did not pursue additional treatment at that time.      Saw Sarina Ch, APRN 22. Had reported not wanting surgery with [facility] due to wanting female provider. Records requested.  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 developmental cystic remnant possible).     22 GYN US   IMPRESSION:  1. Thickened endometrium as above with history of postmenopausal  bleeding would warrant a follow-up endometrial biopsy.    2. No free fluid in the cul-de-sac.  3. Right ovary not identified on this exam.  4. Heterogeneous echotexture of the uterus without focal  parenchymal abnormality.     [facility] Will [facility] records.    Denies any significant vaginal bleeding since she was last seen in the clinic.  Reports occasional pink spotting.  Denies any significant abdominal pain.    Patient denies any chest pain, palpitations, headaches, lightheadedness, shortness of breath, cough, nausea, vomiting, diarrhea, constipation, fever, or chills.     ROS  Review of Systems   Constitutional: Negative.    HENT: Negative.   "  Respiratory: Negative.    Cardiovascular: Negative.    Gastrointestinal: Negative.    Genitourinary: Positive for vaginal bleeding.   Musculoskeletal: Negative.    Skin: Negative.    Psychiatric/Behavioral: Negative.        OBSTETRIC HISTORY  OB History    Para Term  AB Living   10 6 6   4 7   SAB IAB Ectopic Molar Multiple Live Births           1 7      # Outcome Date GA Lbr Talha/2nd Weight Sex Delivery Anes PTL Lv   10 AB            9 AB            8 AB            7 AB            6A Term      Vag-Spont   SUSHMA   6B Term      Vag-Spont   SUSHMA   5 Term      Vag-Spont   SUSHMA   4 Term      Vag-Spont   SUSHMA   3 Term      Vag-Spont   SUSHMA   2 Term      Vag-Spont   SUSHMA   1 Term      Vag-Spont   SUSHMA     GYN HISTORY  Menopause 51  54 yo started bleeding again, sometimes spotting when wiping, sometimes cramping and bleeding like period  History of STIs: CT, s/p tx; states in hospital for one week on IV abx in   Last pap smear: [facility]       Abnormal pap smear history: ASCUS/hpv neg  Contraception: denies    PAST MEDICAL HISTORY  Past Medical History:   Diagnosis Date   • Class 3 severe obesity with body mass index (BMI) of 45.0 to 49.9 in adult (HCC)    • Hypertension      PAST SURGICAL HISTORY  Past Surgical History:   Procedure Laterality Date   • COLONOSCOPY     • DILATATION AND CURETTAGE      x3 surgical    • JOINT REPLACEMENT     • LAPAROSCOPIC CHOLECYSTECTOMY     • POSTPARTUM TUBAL LIGATION     • UMBILICAL HERNIA REPAIR      unsure mesh, \"gave me a belly button\", was child     FAMILY HISTORY  No family history on file.  SOCIAL HISTORY  Social History     Socioeconomic History   • Marital status:    Tobacco Use   • Smoking status: Current Every Day Smoker     Packs/day: 0.50     Types: Cigarettes   • Smokeless tobacco: Never Used   Substance and Sexual Activity   • Alcohol use: Yes     Comment: <1 drink per week   • Drug use: Yes     Types: Marijuana     Comment: NIGHTLY FOR SLEEP PER " PATIENT   • Sexual activity: Yes     Partners: Male     Birth control/protection: Surgical     ALLERGIES  Allergies   Allergen Reactions   • Contrast Dye Rash     HOME MEDICATIONS  Prior to Admission medications    Medication Sig Start Date End Date Taking? Authorizing Provider   amLODIPine (NORVASC) 10 MG tablet Take 10 mg by mouth Daily.    ProviderMarquis MD   losartan (COZAAR) 25 MG tablet Take 25 mg by mouth Daily. 3/31/22   ProviderMarquis MD     PHYSICAL EXAM  /76  BMI 46    General: No acute distress.  Well nourished.  Alert and oriented x 3.  Heart:  RRR, no murmurs, rubs, or gallops  Lungs: CTAB.  No wheezes, rales, or rhonchi.  Abdomen: Soft. Nontender.  No rebound or guarding.  5/31/22 PELVIC EXAM:  External Genitalia/Vulva: Anatomy is normal, no significant redness of labia, no discharge on vulvar tissues, [place]'s and Bartholin's glands are normal, no ulcers, no condylomatous lesions. Postmenopausal atrophy without lesions or other skin changes.  Urethral meatus: Normal, no lesions, no prolapse.  Urethra: Normal, no masses, no tenderness with palpation.  Bladder: Normal, no fullness, no masses, no tenderness with palpation.  Vagina: Vaginal tissues are not inflamed, normal color and texture, no significant discharge present.  Pelvic support adequate.  Cervix: Normal, no lesions, no purulent discharge, no cervical motion tenderness.  Uterus: Normal size, shape, and consistency.  Good mobility noted.  Minimal descent noted with good support.  Adnexa: Normal size and shape bilaterally, no palpable mass bilaterally and non-tender bilaterally.  Rectal: NERIS deferred.    LABS  WBC   Date Value Ref Range Status   07/05/2022 10.13 3.40 - 10.80 10*3/mm3 Final     RBC   Date Value Ref Range Status   07/05/2022 4.28 3.77 - 5.28 10*6/mm3 Final     Hemoglobin   Date Value Ref Range Status   07/05/2022 13.9 12.0 - 15.9 g/dL Final     Hematocrit   Date Value Ref Range Status   07/05/2022 40.2 34.0 -  46.6 % Final     MCV   Date Value Ref Range Status   07/05/2022 93.9 79.0 - 97.0 fL Final     MCH   Date Value Ref Range Status   07/05/2022 32.5 26.6 - 33.0 pg Final     MCHC   Date Value Ref Range Status   07/05/2022 34.6 31.5 - 35.7 g/dL Final     RDW   Date Value Ref Range Status   07/05/2022 14.5 12.3 - 15.4 % Final     RDW-SD   Date Value Ref Range Status   07/05/2022 50.4 37.0 - 54.0 fl Final     MPV   Date Value Ref Range Status   07/05/2022 11.3 6.0 - 12.0 fL Final     Platelets   Date Value Ref Range Status   07/05/2022 237 140 - 450 10*3/mm3 Final     Neutrophil %   Date Value Ref Range Status   07/05/2022 72.9 42.7 - 76.0 % Final     Lymphocyte %   Date Value Ref Range Status   07/05/2022 19.2 (L) 19.6 - 45.3 % Final     Monocyte %   Date Value Ref Range Status   07/05/2022 6.2 5.0 - 12.0 % Final     Eosinophil %   Date Value Ref Range Status   07/05/2022 1.0 0.3 - 6.2 % Final     Basophil %   Date Value Ref Range Status   07/05/2022 0.3 0.0 - 1.5 % Final     Immature Grans %   Date Value Ref Range Status   07/05/2022 0.4 0.0 - 0.5 % Final     Neutrophils, Absolute   Date Value Ref Range Status   07/05/2022 7.38 (H) 1.70 - 7.00 10*3/mm3 Final     Lymphocytes, Absolute   Date Value Ref Range Status   07/05/2022 1.95 0.70 - 3.10 10*3/mm3 Final     Monocytes, Absolute   Date Value Ref Range Status   07/05/2022 0.63 0.10 - 0.90 10*3/mm3 Final     Eosinophils, Absolute   Date Value Ref Range Status   07/05/2022 0.10 0.00 - 0.40 10*3/mm3 Final     Basophils, Absolute   Date Value Ref Range Status   07/05/2022 0.03 0.00 - 0.20 10*3/mm3 Final     Immature Grans, Absolute   Date Value Ref Range Status   07/05/2022 0.04 0.00 - 0.05 10*3/mm3 Final     nRBC   Date Value Ref Range Status   07/05/2022 0.0 0.0 - 0.2 /100 WBC Final     Lab Results   Component Value Date    GLUCOSE 96 07/05/2022    BUN 7 07/05/2022    CREATININE 0.84 07/05/2022    EGFRIFAFRI 81 02/25/2021    BCR 8.3 07/05/2022    K 3.4 (L) 07/05/2022     CO2 25.0 2022    CALCIUM 9.0 2022    ALBUMIN 3.90 2021    AST 12 2021    ALT 10 2021         IMAGING  22 Uterus 8.09 x 4.28 x 5.09 cm, ES 6.7 mm, heterogeoneous uterus, probable small fibroids, ROV not visualized, LOV 1.75 x 1.59 x 1.52 cm  US Non-ob Transvaginal  1. Thickened endometrium as above with history of postmenopausal bleeding would warrant a follow-up endometrial biopsy.  2. No free fluid in the cul-de-sac. 3. Right ovary not identified on this exam. 4. Heterogeneous echotexture of the uterus without focal parenchymal abnormality.  Electronically signed by:  Sylvester Mejia MD  2022 10:58 AM CDT Workstation: [id number]      IMPRESSION  Talya Schumacher is a 61 y.o.  presenting with desire for definitive management of PMB suspected related to AUB-L, failed medical management with oral progesterone.    PLAN  1. PMB (postmenopausal bleeding)  - Intermittent PMB, suspect due to fibroid uterus discussed previously  - EMB negative: discussed previously with patient risk of occult atypia/hyperplasia/malignancy as EMB with 45% sensitivity for disease, 100% sensitivity for cancer; 98-99% specificity; discussed could perform Hsc D&C for further sampling vs. Plan for hysterectomy and BSO  - Discussed options include progesterone therapy +/- Hsc (reports failing Progesterone therapy previously), Hsc D&C and IUD placement, endometrial ablation, or hysterectomy and r/b of each  - Patient strongly desires definitive management with hysterectomy  - Discussed with morbid obesity there is risk of needing to perform HUDSON if anesthesia unable to ventilate with Trendelenburg positioning  - Would plan for entry at Morris's point due to prior umbilical hernia repair  - Will plan for TLH, BSO, Cysto, possible HUDSON if bleeding, scar tissue, ventilation issues or other surgical indication procludes laparoscopic procedure  - Discussed DC home POD#0 if uncomplicated TLH, typically POD#1-2 if  HUDSON pending recovery  - Discussed risk of worsening LAURI after hysterectomy and offered Urology referral, but patient declines at this time  - Discussed risks/benefits of surgery: risk of infection, bleeding, damage to surrounding structures, perforation, need for additional procedures; discussed risk of anesthesia including heart attack, stroke, and death; required COVID screening and plan for outpatient surgery discussed.  - Reviewed plan of care, surgical plan again today and patient desires to proceed  - Records from [facility] reviewed          This document has been electronically signed by Lucia Golden DO on July 5, 2022 10:44 CDT

## 2022-07-07 ENCOUNTER — HOSPITAL ENCOUNTER (OUTPATIENT)
Facility: HOSPITAL | Age: 62
Setting detail: HOSPITAL OUTPATIENT SURGERY
Discharge: HOME | End: 2022-07-07
Attending: STUDENT IN AN ORGANIZED HEALTH CARE EDUCATION/TRAINING PROGRAM | Admitting: STUDENT IN AN ORGANIZED HEALTH CARE EDUCATION/TRAINING PROGRAM

## 2022-07-07 ENCOUNTER — ANESTHESIA (OUTPATIENT)
Dept: PERIOP | Facility: HOSPITAL | Age: 62
End: 2022-07-07

## 2022-07-07 ENCOUNTER — ANESTHESIA EVENT (OUTPATIENT)
Dept: PERIOP | Facility: HOSPITAL | Age: 62
End: 2022-07-07

## 2022-07-07 VITALS
OXYGEN SATURATION: 97 % | SYSTOLIC BLOOD PRESSURE: 173 MMHG | HEIGHT: 67 IN | DIASTOLIC BLOOD PRESSURE: 78 MMHG | RESPIRATION RATE: 18 BRPM | BODY MASS INDEX: 45.99 KG/M2 | HEART RATE: 71 BPM | WEIGHT: 293 LBS | TEMPERATURE: 96.5 F

## 2022-07-07 DIAGNOSIS — Z90.721 STATUS POST HYSTERECTOMY WITH OOPHORECTOMY: Primary | ICD-10-CM

## 2022-07-07 DIAGNOSIS — Z90.710 STATUS POST HYSTERECTOMY WITH OOPHORECTOMY: Primary | ICD-10-CM

## 2022-07-07 DIAGNOSIS — N95.0 PMB (POSTMENOPAUSAL BLEEDING): ICD-10-CM

## 2022-07-07 LAB
ABO GROUP BLD: NORMAL
AMPHET+METHAMPHET UR QL: NEGATIVE
AMPHETAMINES UR QL: NEGATIVE
B-HCG UR QL: NEGATIVE
BARBITURATES UR QL SCN: NEGATIVE
BENZODIAZ UR QL SCN: NEGATIVE
BLD GP AB SCN SERPL QL: NEGATIVE
BUPRENORPHINE SERPL-MCNC: NEGATIVE NG/ML
CANNABINOIDS SERPL QL: POSITIVE
COCAINE UR QL: NEGATIVE
Lab: NORMAL
METHADONE UR QL SCN: NEGATIVE
OPIATES UR QL: NEGATIVE
OXYCODONE UR QL SCN: NEGATIVE
PCP UR QL SCN: NEGATIVE
PROPOXYPH UR QL: NEGATIVE
RH BLD: POSITIVE
T&S EXPIRATION DATE: NORMAL
TRICYCLICS UR QL SCN: NEGATIVE

## 2022-07-07 PROCEDURE — 86850 RBC ANTIBODY SCREEN: CPT | Performed by: ANESTHESIOLOGY

## 2022-07-07 PROCEDURE — 25010000002 SUCCINYLCHOLINE PER 20 MG: Performed by: NURSE ANESTHETIST, CERTIFIED REGISTERED

## 2022-07-07 PROCEDURE — 0 LIDOCAINE 1 % SOLUTION: Performed by: NURSE ANESTHETIST, CERTIFIED REGISTERED

## 2022-07-07 PROCEDURE — 25010000002 HYDROMORPHONE 1 MG/ML SOLUTION: Performed by: NURSE ANESTHETIST, CERTIFIED REGISTERED

## 2022-07-07 PROCEDURE — 25010000002 CEFAZOLIN PER 500 MG: Performed by: STUDENT IN AN ORGANIZED HEALTH CARE EDUCATION/TRAINING PROGRAM

## 2022-07-07 PROCEDURE — 58571 TLH W/T/O 250 G OR LESS: CPT

## 2022-07-07 PROCEDURE — 25010000002 ONDANSETRON PER 1 MG: Performed by: NURSE ANESTHETIST, CERTIFIED REGISTERED

## 2022-07-07 PROCEDURE — 25010000002 PROPOFOL 10 MG/ML EMULSION: Performed by: NURSE ANESTHETIST, CERTIFIED REGISTERED

## 2022-07-07 PROCEDURE — 80306 DRUG TEST PRSMV INSTRMNT: CPT | Performed by: ANESTHESIOLOGY

## 2022-07-07 PROCEDURE — 58571 TLH W/T/O 250 G OR LESS: CPT | Performed by: STUDENT IN AN ORGANIZED HEALTH CARE EDUCATION/TRAINING PROGRAM

## 2022-07-07 PROCEDURE — 25010000002 NEOSTIGMINE 10 MG/10ML SOLUTION: Performed by: NURSE ANESTHETIST, CERTIFIED REGISTERED

## 2022-07-07 PROCEDURE — 86900 BLOOD TYPING SEROLOGIC ABO: CPT | Performed by: ANESTHESIOLOGY

## 2022-07-07 PROCEDURE — 81025 URINE PREGNANCY TEST: CPT | Performed by: STUDENT IN AN ORGANIZED HEALTH CARE EDUCATION/TRAINING PROGRAM

## 2022-07-07 PROCEDURE — 25010000002 MIDAZOLAM PER 1 MG: Performed by: NURSE ANESTHETIST, CERTIFIED REGISTERED

## 2022-07-07 PROCEDURE — 25010000002 FENTANYL CITRATE (PF) 50 MCG/ML SOLUTION: Performed by: NURSE ANESTHETIST, CERTIFIED REGISTERED

## 2022-07-07 PROCEDURE — 94640 AIRWAY INHALATION TREATMENT: CPT

## 2022-07-07 PROCEDURE — 86901 BLOOD TYPING SEROLOGIC RH(D): CPT | Performed by: ANESTHESIOLOGY

## 2022-07-07 PROCEDURE — 88307 TISSUE EXAM BY PATHOLOGIST: CPT

## 2022-07-07 DEVICE — FLOSEAL HEMOSTATIC MATRIX, 10ML
Type: IMPLANTABLE DEVICE | Site: ABDOMEN | Status: FUNCTIONAL
Brand: FLOSEAL HEMOSTATIC MATRIX

## 2022-07-07 RX ORDER — SODIUM CHLORIDE, SODIUM LACTATE, POTASSIUM CHLORIDE, CALCIUM CHLORIDE 600; 310; 30; 20 MG/100ML; MG/100ML; MG/100ML; MG/100ML
125 INJECTION, SOLUTION INTRAVENOUS CONTINUOUS
Status: DISCONTINUED | OUTPATIENT
Start: 2022-07-07 | End: 2022-07-07 | Stop reason: HOSPADM

## 2022-07-07 RX ORDER — OXYCODONE HYDROCHLORIDE 5 MG/1
5 TABLET ORAL EVERY 4 HOURS PRN
Qty: 12 TABLET | Refills: 0 | Status: SHIPPED | OUTPATIENT
Start: 2022-07-07

## 2022-07-07 RX ORDER — PROMETHAZINE HYDROCHLORIDE 25 MG/1
25 TABLET ORAL ONCE AS NEEDED
Status: DISCONTINUED | OUTPATIENT
Start: 2022-07-07 | End: 2022-07-07 | Stop reason: HOSPADM

## 2022-07-07 RX ORDER — PROPOFOL 10 MG/ML
VIAL (ML) INTRAVENOUS AS NEEDED
Status: DISCONTINUED | OUTPATIENT
Start: 2022-07-07 | End: 2022-07-07 | Stop reason: SURG

## 2022-07-07 RX ORDER — DOCUSATE SODIUM 100 MG/1
100 CAPSULE, LIQUID FILLED ORAL 2 TIMES DAILY
Qty: 60 CAPSULE | Refills: 1 | Status: SHIPPED | OUTPATIENT
Start: 2022-07-07

## 2022-07-07 RX ORDER — VECURONIUM BROMIDE 1 MG/ML
INJECTION, POWDER, LYOPHILIZED, FOR SOLUTION INTRAVENOUS AS NEEDED
Status: DISCONTINUED | OUTPATIENT
Start: 2022-07-07 | End: 2022-07-07 | Stop reason: SURG

## 2022-07-07 RX ORDER — NALOXONE HCL 0.4 MG/ML
0.4 VIAL (ML) INJECTION AS NEEDED
Status: DISCONTINUED | OUTPATIENT
Start: 2022-07-07 | End: 2022-07-07 | Stop reason: HOSPADM

## 2022-07-07 RX ORDER — SODIUM CHLORIDE, SODIUM GLUCONATE, SODIUM ACETATE, POTASSIUM CHLORIDE AND MAGNESIUM CHLORIDE 526; 502; 368; 37; 30 MG/100ML; MG/100ML; MG/100ML; MG/100ML; MG/100ML
INJECTION, SOLUTION INTRAVENOUS CONTINUOUS PRN
Status: DISCONTINUED | OUTPATIENT
Start: 2022-07-07 | End: 2022-07-07 | Stop reason: SURG

## 2022-07-07 RX ORDER — DIPHENHYDRAMINE HYDROCHLORIDE 50 MG/ML
12.5 INJECTION INTRAMUSCULAR; INTRAVENOUS
Status: DISCONTINUED | OUTPATIENT
Start: 2022-07-07 | End: 2022-07-07 | Stop reason: HOSPADM

## 2022-07-07 RX ORDER — LIDOCAINE HYDROCHLORIDE 10 MG/ML
INJECTION, SOLUTION INFILTRATION; PERINEURAL AS NEEDED
Status: DISCONTINUED | OUTPATIENT
Start: 2022-07-07 | End: 2022-07-07

## 2022-07-07 RX ORDER — BUPIVACAINE HYDROCHLORIDE 2.5 MG/ML
INJECTION, SOLUTION EPIDURAL; INFILTRATION; INTRACAUDAL AS NEEDED
Status: DISCONTINUED | OUTPATIENT
Start: 2022-07-07 | End: 2022-07-07 | Stop reason: HOSPADM

## 2022-07-07 RX ORDER — EPHEDRINE SULFATE 50 MG/ML
5 INJECTION, SOLUTION INTRAVENOUS ONCE AS NEEDED
Status: DISCONTINUED | OUTPATIENT
Start: 2022-07-07 | End: 2022-07-07 | Stop reason: HOSPADM

## 2022-07-07 RX ORDER — CEFAZOLIN SODIUM IN 0.9 % NACL 3 G/100 ML
3 INTRAVENOUS SOLUTION, PIGGYBACK (ML) INTRAVENOUS ONCE
Status: COMPLETED | OUTPATIENT
Start: 2022-07-07 | End: 2022-07-07

## 2022-07-07 RX ORDER — NEOSTIGMINE METHYLSULFATE 1 MG/ML
INJECTION, SOLUTION INTRAVENOUS AS NEEDED
Status: DISCONTINUED | OUTPATIENT
Start: 2022-07-07 | End: 2022-07-07 | Stop reason: SURG

## 2022-07-07 RX ORDER — FENTANYL CITRATE 50 UG/ML
INJECTION, SOLUTION INTRAMUSCULAR; INTRAVENOUS AS NEEDED
Status: DISCONTINUED | OUTPATIENT
Start: 2022-07-07 | End: 2022-07-07 | Stop reason: SURG

## 2022-07-07 RX ORDER — ACETAMINOPHEN 650 MG/1
650 SUPPOSITORY RECTAL ONCE AS NEEDED
Status: DISCONTINUED | OUTPATIENT
Start: 2022-07-07 | End: 2022-07-07 | Stop reason: HOSPADM

## 2022-07-07 RX ORDER — ALBUTEROL SULFATE 2.5 MG/3ML
2.5 SOLUTION RESPIRATORY (INHALATION) ONCE
Status: COMPLETED | OUTPATIENT
Start: 2022-07-07 | End: 2022-07-07

## 2022-07-07 RX ORDER — SUCCINYLCHOLINE CHLORIDE 20 MG/ML
INJECTION INTRAMUSCULAR; INTRAVENOUS AS NEEDED
Status: DISCONTINUED | OUTPATIENT
Start: 2022-07-07 | End: 2022-07-07 | Stop reason: SURG

## 2022-07-07 RX ORDER — IBUPROFEN 800 MG/1
800 TABLET ORAL EVERY 6 HOURS PRN
Qty: 40 TABLET | Refills: 1 | Status: SHIPPED | OUTPATIENT
Start: 2022-07-07

## 2022-07-07 RX ORDER — FLUMAZENIL 0.1 MG/ML
0.2 INJECTION INTRAVENOUS AS NEEDED
Status: DISCONTINUED | OUTPATIENT
Start: 2022-07-07 | End: 2022-07-07 | Stop reason: HOSPADM

## 2022-07-07 RX ORDER — ACETAMINOPHEN 325 MG/1
650 TABLET ORAL EVERY 4 HOURS PRN
Qty: 100 TABLET | Refills: 2 | Status: SHIPPED | OUTPATIENT
Start: 2022-07-07 | End: 2023-07-07

## 2022-07-07 RX ORDER — LIDOCAINE HYDROCHLORIDE 10 MG/ML
INJECTION, SOLUTION INFILTRATION; PERINEURAL AS NEEDED
Status: DISCONTINUED | OUTPATIENT
Start: 2022-07-07 | End: 2022-07-07 | Stop reason: SURG

## 2022-07-07 RX ORDER — ONDANSETRON 4 MG/1
4 TABLET, FILM COATED ORAL DAILY PRN
Qty: 30 TABLET | Refills: 1 | Status: SHIPPED | OUTPATIENT
Start: 2022-07-07 | End: 2023-07-07

## 2022-07-07 RX ORDER — ONDANSETRON 2 MG/ML
INJECTION INTRAMUSCULAR; INTRAVENOUS AS NEEDED
Status: DISCONTINUED | OUTPATIENT
Start: 2022-07-07 | End: 2022-07-07 | Stop reason: SURG

## 2022-07-07 RX ORDER — ACETAMINOPHEN 325 MG/1
650 TABLET ORAL ONCE AS NEEDED
Status: DISCONTINUED | OUTPATIENT
Start: 2022-07-07 | End: 2022-07-07 | Stop reason: HOSPADM

## 2022-07-07 RX ORDER — MEPERIDINE HYDROCHLORIDE 25 MG/ML
12.5 INJECTION INTRAMUSCULAR; INTRAVENOUS; SUBCUTANEOUS
Status: DISCONTINUED | OUTPATIENT
Start: 2022-07-07 | End: 2022-07-07 | Stop reason: HOSPADM

## 2022-07-07 RX ORDER — MIDAZOLAM HYDROCHLORIDE 1 MG/ML
INJECTION INTRAMUSCULAR; INTRAVENOUS AS NEEDED
Status: DISCONTINUED | OUTPATIENT
Start: 2022-07-07 | End: 2022-07-07 | Stop reason: SURG

## 2022-07-07 RX ORDER — PROMETHAZINE HYDROCHLORIDE 25 MG/1
25 SUPPOSITORY RECTAL ONCE AS NEEDED
Status: DISCONTINUED | OUTPATIENT
Start: 2022-07-07 | End: 2022-07-07 | Stop reason: HOSPADM

## 2022-07-07 RX ORDER — ONDANSETRON 2 MG/ML
4 INJECTION INTRAMUSCULAR; INTRAVENOUS ONCE AS NEEDED
Status: DISCONTINUED | OUTPATIENT
Start: 2022-07-07 | End: 2022-07-07 | Stop reason: HOSPADM

## 2022-07-07 RX ADMIN — FENTANYL CITRATE 50 MCG: 50 INJECTION INTRAMUSCULAR; INTRAVENOUS at 08:20

## 2022-07-07 RX ADMIN — MIDAZOLAM HYDROCHLORIDE 2 MG: 1 INJECTION, SOLUTION INTRAMUSCULAR; INTRAVENOUS at 08:12

## 2022-07-07 RX ADMIN — GLYCOPYRROLATE 0.6 MG: 0.2 INJECTION, SOLUTION INTRAMUSCULAR; INTRAVITREAL at 11:50

## 2022-07-07 RX ADMIN — VECURONIUM BROMIDE 6 MG: 1 INJECTION, POWDER, LYOPHILIZED, FOR SOLUTION INTRAVENOUS at 08:41

## 2022-07-07 RX ADMIN — LIDOCAINE HYDROCHLORIDE 50 MG: 10 INJECTION, SOLUTION INFILTRATION; PERINEURAL at 08:20

## 2022-07-07 RX ADMIN — ONDANSETRON 4 MG: 2 INJECTION INTRAMUSCULAR; INTRAVENOUS at 11:49

## 2022-07-07 RX ADMIN — HYDROMORPHONE HYDROCHLORIDE 0.5 MG: 1 INJECTION, SOLUTION INTRAMUSCULAR; INTRAVENOUS; SUBCUTANEOUS at 12:45

## 2022-07-07 RX ADMIN — FENTANYL CITRATE 50 MCG: 50 INJECTION INTRAMUSCULAR; INTRAVENOUS at 08:54

## 2022-07-07 RX ADMIN — SODIUM CHLORIDE, POTASSIUM CHLORIDE, SODIUM LACTATE AND CALCIUM CHLORIDE 125 ML/HR: 600; 310; 30; 20 INJECTION, SOLUTION INTRAVENOUS at 07:27

## 2022-07-07 RX ADMIN — Medication 3 G: at 08:37

## 2022-07-07 RX ADMIN — NEOSTIGMINE METHYLSULFATE 3 MG: 0.5 INJECTION INTRAVENOUS at 11:50

## 2022-07-07 RX ADMIN — ALBUTEROL SULFATE 2.5 MG: 2.5 SOLUTION RESPIRATORY (INHALATION) at 07:27

## 2022-07-07 RX ADMIN — VECURONIUM BROMIDE 1 MG: 1 INJECTION, POWDER, LYOPHILIZED, FOR SOLUTION INTRAVENOUS at 08:20

## 2022-07-07 RX ADMIN — SODIUM CHLORIDE, SODIUM GLUCONATE, SODIUM ACETATE, POTASSIUM CHLORIDE AND MAGNESIUM CHLORIDE: 526; 502; 368; 37; 30 INJECTION, SOLUTION INTRAVENOUS at 11:55

## 2022-07-07 RX ADMIN — PROPOFOL 200 MG: 10 INJECTION, EMULSION INTRAVENOUS at 08:20

## 2022-07-07 RX ADMIN — FENTANYL CITRATE 50 MCG: 50 INJECTION INTRAMUSCULAR; INTRAVENOUS at 10:05

## 2022-07-07 RX ADMIN — SUCCINYLCHOLINE CHLORIDE 120 MG: 20 INJECTION, SOLUTION INTRAMUSCULAR; INTRAVENOUS at 08:20

## 2022-07-07 NOTE — INTERVAL H&P NOTE
"61 y.o.  presenting with desire for definitive management of PMB suspected related to AUB-L, failed medical management with oral progesterone.  Patient ready to proceed with TLH, BSO, cysto, possible HUDSON. Discussed surgical plan again in detail and patient understands and desires oophorectomy with procedure due to postmenopausal status.    Vitals:    22 0717   BP: 137/78   BP Location: Right arm   Patient Position: Lying   Pulse: 77   Resp: 20   Temp: 97 °F (36.1 °C)   TempSrc: Temporal   SpO2: 98%   Weight: 134 kg (294 lb 5 oz)   Height: 170.2 cm (67\")     Gen: well appearing, NAD  CV: RRR  Chest: no increased work of breathing  Abd: nontender    61 y.o.  presenting with desire for definitive management of PMB suspected related to AUB-L, failed medical management with oral progesterone.  To OR for TLH, BSO, cysto, possible HUDSON if unable to adequately ventilate.  Patient late to same day surgery but checked in and ready to proceed to OR  "

## 2022-07-07 NOTE — ANESTHESIA POSTPROCEDURE EVALUATION
Patient: Talya Schumacher    Procedure Summary     Date: 07/07/22 Room / Location: [facility] OR [address] [place] [address] OR    Anesthesia Start: 0813 Anesthesia Stop: 1259    Procedure: TOTAL LAPAROSCOPIC HYSTERECTOMY, BILATERAL SALPINGO OOPHORECTOMY, CYSTOSCOPY (N/A Abdomen) Diagnosis:       PMB (postmenopausal bleeding)      (PMB (postmenopausal bleeding) [N95.0])    Surgeons: Lucia Golden DO Provider: Canelo Dan MD    Anesthesia Type: general ASA Status: 3          Anesthesia Type: general    Vitals  Vitals Value Taken Time   /68 07/07/22 1255   Temp 99.6 °F (37.6 °C) 07/07/22 1210   Pulse 72 07/07/22 1255   Resp 16 07/07/22 1255   SpO2 97 % 07/07/22 1255           Post Anesthesia Care and Evaluation    Patient location during evaluation: PACU  Patient participation: complete - patient participated  Level of consciousness: sleepy but conscious  Pain score: 0  Pain management: adequate    Airway patency: patent  Anesthetic complications: No anesthetic complications  PONV Status: none  Cardiovascular status: acceptable  Respiratory status: acceptable  Hydration status: acceptable    Comments: --------------------            07/07/22               1255     --------------------   BP:       148/68     Pulse:      72       Resp:       16       Temp:                SpO2:      97%      --------------------

## 2022-07-07 NOTE — ANESTHESIA PROCEDURE NOTES
Airway  Urgency: elective    Date/Time: 7/7/2022 8:23 AM  Airway not difficult    General Information and Staff    Patient location during procedure: OR  CRNA/CAA: Cosme Kerr CRNA    Indications and Patient Condition  Indications for airway management: airway protection    Preoxygenated: yes  Mask difficulty assessment: 0 - not attempted    Final Airway Details  Final airway type: endotracheal airway      Successful airway: ETT  Cuffed: yes   Successful intubation technique: direct laryngoscopy  Facilitating devices/methods: intubating stylet  Endotracheal tube insertion site: oral  Blade: Ramirez  Blade size: 4  ETT size (mm): 8.0  Cormack-Lehane Classification: grade I - full view of glottis  Placement verified by: chest auscultation and capnometry   Measured from: lips  ETT/EBT  to lips (cm): 20  Number of attempts at approach: 1  Assessment: lips, teeth, and gum same as pre-op and atraumatic intubation

## 2022-07-07 NOTE — OP NOTE
[facility]  Operative Report    Name: Talya Schumacher  MRN: [id number]  Date: 7/7/2022  CSN: [id number]      Location: [facility]    Service: Gynecology    Pre-op Diagnosis: persistent postmenopausal bleeding with failed medical management    Post-op Diagnosis: same    Surgeon: Lucia Golden DO    Assistant: Ximena Cao CST was responsible for performing the following activities: Retraction, Suction, Irrigation, Suturing, Closing, Placing Dressing and Held/Positioned Camera and their skilled assistance was necessary for the success of this case.    Joie Garcia CST was responsible for performing the following activities: uterine manipulation, Suturing, Closing and their skilled assistance was necessary for the success of this case.    Staff:  Circulator: Brittanie Reyes RN; Alix aVladez RN  Scrub Person: Barby Young  Assistant: Joie Garcia CSA; Ximena Cao CSFA    Anesthesia: General    Anesthesia Staff:  Anesthesiologist: Canelo Dan MD  CRNA: Cosme Kerr CRNA    Operation: Total laparoscopic hysterectomy, bilateral salpingo-oophorectomy, cystoscopy, lysis of adhesions (> 30 minutes)    Drains: adames removed prior to end of surgery    Complications: none    Findings: Normal-appearing uterus, cervix, bilateral fallopian tubes, bilateral ovaries.  Ovaries are atrophic consistent with menopausal state.  Normal-appearing bladder mucosa with urinary sediment, strong jets from bilateral ureters, no bladder injuries.  Significant scarring of the omentum to the umbilicus at site of prior hernia repair, small adhesion from the peritoneum overlying the bladder to the umbilicus, likely remnant noted on prior CT scan.    Condition: stable    Specimens/Disposition: uterus, cervix, bilateral fallopian tubes and ovaries    Estimated Blood Loss: 50 mL  IV Fluids: see anesthesia documentation  Urine Output: see anesthesia documentation    Indications: Talya Schumacher, 61  y.o.  presenting with desire for definitive management of PMB suspected related to AUB-L, failed medical management with oral progesterone.    Description of Operation:  After appropriate consents were obtained, the patient was taken to the operating room.  The patient was identified and the procedure verified.  The patient was given general endotracheal anesthesia and placed in the dorsal lithotomy position.  She was draped, prepped in the usual sterile manner. Time out was performed and procedure verified.      Martini catheter was placed.  Vaginal retractors were placed into the vagina.  A single toothed tenaculum was used to grasp the anterior lip of the cervix.  A HOTEL Top-Level Domain uterine manipulator with medium Koh cup and 8-tip was placed through the cervix into the uterus.     Attention was then turned towards the abdomen.  An incision was made in the left upper quadrant at Morris's point after injecting local anesthesia.  A Veress needle was placed in the abdomen was insufflated establishing pneumoperitoneum with initial pressure less than 8 mmHg.the trocar was then introduced with the scope evidencing appropriate entry into the abdominal cavity as well as significant scarring from the omentum to the umbilicus.  The scope was inserted again and visualized no injury to underlying bowel or tissues.  The above findings were noted.  A 5 mm incision was made lateral to the umbilicus after injecting local anesthesia on the right side through which a 5 mm trocar and sleeve were passed through under direct visualization without difficulty.  An 11 mm incision was made lateral to the umbilicus on the left side after injecting local anesthesia, through which am 11 mm trocar and sleeve were passed through under direct visualization.    The adhesions from the omentum to the umbilicus were slowly taken down in layers to ensure no underlying bowel was present.the remnant from the bladder to the umbilicus was then taken down at  the level of the umbilicus through a clear adhesion.  An inferior umbilical incision was then made after injection of local anesthesia through which a 5 mm trocar was placed under direct visualization.    The uterus was then visualized and the patient was placed in Trendelenburg positioning.  The left ovary and tube were grasped and retracted anteriorly and medially away from the pelvic sidewall.  The infundibulopelvic ligament, followed by the uteroovarian ligament, and then the proximal fallopian tube were then sealed and divided using Enseal.  This freed the left ovary, and distal portion of the fallopian tube from the pelvis.  The specimen was then placed in the cul-de-sac and removed at the time of removal of the uterus through the vagina.  Excision site was inspected and found to be hemostatic.       The Enseal was then used to clamp, cauterize, and cut the round ligament on the left. The anterior broad ligament was taken down to begin creation of the bladder flap on the left. Attention was then turned towards the right side of the uterus. The Enseal was then used to clamp, cauterize, and cut the right round ligament.  The Enseal was then used to clamp, cauterize, and cut the right uteroovarian ligament. Careful attention was given as the bladder flap was then created using blunt and sharp dissection with the Enseal.  Once the bladder was completely dissected off the uterus, the Enseal was used to clamp, cauterize, and cut the left uterine artery.  The left cardinal ligament was then clamped cauterized, and cut.  In like fashion on the right the uterine artery was skeletonized, cauterized, clamped, and cut.  The right cardinal ligament was clamped, cauterized, and cut.  The uterus and cervix were then amputated from the vagina using the L-hook scalpel.  The vaginal cuff was closed with endostitch.  Upon throwing the final left lateral stitch of the vaginal cuff incision, it was noted that the Endo Stitch  needle had broken in half with half of the needle still attached to the Endo Stitch device.  The device was carefully removed and inspected.  We then attempted to visualize the retained end of the Endo Stitch needle.  After careful inspection and attempted palpation through the vagina and with an instrument through the abdomen decision was made to open the cuff slightly at the left most aspect.  The cuff stitch was cut just proximal to this area and removed.  Upon opening the left end of the vaginal cuff, the retained needle was noted and carefully removed with a needle .   It was inspected upon removal and found to be the complete remnant of the broken needle.  A new Endo Stitch was opened and used to close the open area of the cuff from the left towards the right.  Approximately midway across the vaginal cuff, it was noted that this Endo Stitch needle also broke in half.  The Endo Stitch device was removed with half of the needle attached.  The retained portion of the needle was noted to be protruding from the vaginal cuff and was carefully removed with a needle .  It was noted that the entire needle had been removed.  All needles and Endo Stitch devices were collected to be sent to the  for evaluation due to device malfunction.  Hemostasis was noted from the vaginal cuff.  Suction irrigation was then performed.  Looking laparoscopically, good hemostasis was noted at this time from the vaginal cuff and pedicles.  Surgicel was placed over the vaginal cuff for additional prophylactic hemostasis.  The left lower quadrant side port was removed under direct visualization and the Humza-Caroline device used to close the fascia using Vicryl stitch. It was visualized and palpated and found to be well closed. The right lower quadrant and umbilical ports were then removed under direct visualization.  The intraabdominal carbon dioxide was allowed to escape.  The skin incisions were closed with 3-0  Monocryl.  A cystoscopy was performed demonstrating bilateral spill from both ureteral orifices, a bubble at the dome of the bladder and no suture seen within the bladder. Sponge, needle, and instrument counts were correct x2.  There were no intraoperative complications, and patient tolerated the procedure well.  She was extubated and escorted to the recovery room in stable condition.    The patient received Cefazolin 3g for antibiotic prophylaxis prior to the start of the procedure.        This document has been electronically signed by Lucia Golden DO on July 7, 2022 12:18 CDT

## 2022-07-07 NOTE — ANESTHESIA PREPROCEDURE EVALUATION
Anesthesia Evaluation     Patient summary reviewed and Nursing notes reviewed   no history of anesthetic complications:  NPO Solid Status: > 8 hours  NPO Liquid Status: > 8 hours           Airway   Mallampati: II  TM distance: >3 FB  Neck ROM: full  possible difficult intubation  Dental    (+) poor dentation    Comment: Upper and lower missing, carious dentition. Overall poor condition.    Pulmonary    (+) a smoker Current Abstained day of surgery, COPD moderate, decreased breath sounds,   (-) shortness of breath    PE comment: Albuterol treatment ordered pre-op.  Cardiovascular     ECG reviewed  Rhythm: regular  Rate: normal    (+) hypertension,   (-) angina, LOOMIS, murmur    ROS comment: Normal sinus rhythm  Low voltage QRS  Borderline ECG  No previous ECGs available    Neuro/Psych  (+) psychiatric history Depression and PTSD,    GI/Hepatic/Renal/Endo    (+) morbid obesity,      Musculoskeletal     Abdominal   (+) obese,    Substance History   (+) drug use (MJ)     OB/GYN negative ob/gyn ROS   Gestational age approximate: Previous tubal ligation.    Comment: Post menopausal bleeding.      Other   arthritis,                      Anesthesia Plan    ASA 3     general     intravenous induction     Anesthetic plan, risks, benefits, and alternatives have been provided, discussed and informed consent has been obtained with: patient.  Use of blood products discussed with patient  Consented to blood products.       CODE STATUS:

## 2022-07-08 LAB
QT INTERVAL: 392 MS
QTC INTERVAL: 471 MS

## 2022-07-11 LAB — REF LAB TEST METHOD: NORMAL

## 2022-07-19 ENCOUNTER — OFFICE VISIT (OUTPATIENT)
Dept: OBSTETRICS AND GYNECOLOGY | Facility: CLINIC | Age: 62
End: 2022-07-19

## 2022-07-19 VITALS
DIASTOLIC BLOOD PRESSURE: 60 MMHG | HEIGHT: 67 IN | BODY MASS INDEX: 45.99 KG/M2 | SYSTOLIC BLOOD PRESSURE: 158 MMHG | WEIGHT: 293 LBS

## 2022-07-19 DIAGNOSIS — Z90.710 S/P HYSTERECTOMY WITH OOPHORECTOMY: Primary | ICD-10-CM

## 2022-07-19 DIAGNOSIS — Z09 POSTOPERATIVE EXAMINATION: ICD-10-CM

## 2022-07-19 DIAGNOSIS — Z90.721 S/P HYSTERECTOMY WITH OOPHORECTOMY: Primary | ICD-10-CM

## 2022-07-19 PROCEDURE — 99024 POSTOP FOLLOW-UP VISIT: CPT | Performed by: STUDENT IN AN ORGANIZED HEALTH CARE EDUCATION/TRAINING PROGRAM

## 2022-07-27 ENCOUNTER — TELEPHONE (OUTPATIENT)
Dept: OBSTETRICS AND GYNECOLOGY | Facility: CLINIC | Age: 62
End: 2022-07-27

## 2022-07-27 NOTE — TELEPHONE ENCOUNTER
----- Message from Jennifer Cornelius sent at 7/27/2022  8:40 AM CDT -----  PATIENT CALLED AND SAID THAT SHE IS SEEING BLOOD WHEN SHE WIPES AND IS CONCERNED. CAN YOU PLEASE CALL THE PATIENT

## 2022-07-27 NOTE — TELEPHONE ENCOUNTER
Spoke with patient who stated that the blood is inbetween heavy and light. She stated that it happened yesterday and today. It is when she wipes. I asked if she was soaking a pad and she stated no as she didn't wear any underwear. It was just when she wipes. I let her know that per Dr. Golden it is totally normal and as long as she isn't soaking through a pad then she is fine and if she does then she needs to let us know. She verbalized her understanding.

## 2022-08-17 ENCOUNTER — OFFICE VISIT (OUTPATIENT)
Dept: OBSTETRICS AND GYNECOLOGY | Facility: CLINIC | Age: 62
End: 2022-08-17

## 2022-08-17 VITALS
DIASTOLIC BLOOD PRESSURE: 90 MMHG | BODY MASS INDEX: 45.64 KG/M2 | WEIGHT: 290.8 LBS | SYSTOLIC BLOOD PRESSURE: 140 MMHG | HEIGHT: 67 IN

## 2022-08-17 DIAGNOSIS — Z12.31 BREAST CANCER SCREENING BY MAMMOGRAM: ICD-10-CM

## 2022-08-17 DIAGNOSIS — Z90.721 STATUS POST HYSTERECTOMY WITH OOPHORECTOMY: Primary | ICD-10-CM

## 2022-08-17 DIAGNOSIS — Z90.710 STATUS POST HYSTERECTOMY WITH OOPHORECTOMY: Primary | ICD-10-CM

## 2022-08-17 DIAGNOSIS — Z09 POSTOPERATIVE EXAMINATION: ICD-10-CM

## 2022-08-17 PROCEDURE — 99024 POSTOP FOLLOW-UP VISIT: CPT | Performed by: STUDENT IN AN ORGANIZED HEALTH CARE EDUCATION/TRAINING PROGRAM

## 2022-11-29 NOTE — NURSING NOTE
,birp  
Behavior   Note any precipitants to event or behavior   Describe level and action of any aggressive behavior or speech and associated interventions.     Anxiety: Patient denies at this time  Depression: Patient denies at this time  Pain  0  AVH   no  S/I   no  Plan  no  H/I   no  Plan  no    Affect   euthymic/normal      Note: Patient in hallway upon assessment. Patient was cooperative and interacting with peers and staff. Patient denies SI/HI/AVH at this time. Patient informed signee that she was going home tomorrow.       Intervention    PRN medication utilized:  no    Instructed in medication usage and effects  Medications administered as ordered  Encouraged to verbalize needs      Response    Verbalized understanding   Did patient take medications as ordered yes   Did patient interact with assessment?  yes     Plan    Will monitor for safety  Will monitor every 15 minutes as ordered  Will evaluate and promote the plan of care    Last BM:  unknown date  (Please chart in I/O as well)  
Patient is alert and oriented x 4. Patient is ambulatory at discharge. Personal belongings returned from [facility] and security. Medications and follow up appointments reviewed with patient prior to discharge. AVS and safety plan provided. Patient's  here to take patient home.   
chills, dyspnea, hypoxia - +ve COVID

## 2023-03-22 ENCOUNTER — TRANSCRIBE ORDERS (OUTPATIENT)
Dept: ADMINISTRATIVE | Facility: HOSPITAL | Age: 63
End: 2023-03-22
Payer: MEDICARE

## 2023-03-22 DIAGNOSIS — M79.662 PAIN OF LEFT CALF: Primary | ICD-10-CM

## 2023-03-22 DIAGNOSIS — M79.89 SWELLING OF LIMB: ICD-10-CM

## 2023-03-22 DIAGNOSIS — I73.9 PVD (PERIPHERAL VASCULAR DISEASE): Primary | ICD-10-CM

## 2023-04-13 DIAGNOSIS — I70.213 ATHEROSCLEROSIS OF NATIVE ARTERIES OF EXTREMITIES WITH INTERMITTENT CLAUDICATION, BILATERAL LEGS: ICD-10-CM

## 2023-04-13 DIAGNOSIS — I73.9 PVD (PERIPHERAL VASCULAR DISEASE): Primary | ICD-10-CM

## 2023-04-13 RX ORDER — PREDNISONE 50 MG/1
50 TABLET ORAL EVERY 6 HOURS SCHEDULED
Qty: 3 TABLET | Refills: 0 | Status: SHIPPED | OUTPATIENT
Start: 2023-04-13 | End: 2024-04-12

## 2023-04-15 ENCOUNTER — TRANSCRIBE ORDERS (OUTPATIENT)
Dept: GENERAL RADIOLOGY | Facility: HOSPITAL | Age: 63
End: 2023-04-15
Payer: MEDICARE

## 2023-04-15 DIAGNOSIS — I73.9 PVD (PERIPHERAL VASCULAR DISEASE): Primary | ICD-10-CM

## 2023-04-21 ENCOUNTER — LAB (OUTPATIENT)
Dept: LAB | Facility: HOSPITAL | Age: 63
End: 2023-04-21
Payer: MEDICARE

## 2023-04-21 ENCOUNTER — HOSPITAL ENCOUNTER (OUTPATIENT)
Dept: CT IMAGING | Facility: HOSPITAL | Age: 63
Discharge: HOME | End: 2023-04-21
Payer: MEDICARE

## 2023-04-21 DIAGNOSIS — I73.9 PVD (PERIPHERAL VASCULAR DISEASE): ICD-10-CM

## 2023-04-21 DIAGNOSIS — I70.213 ATHEROSCLEROSIS OF NATIVE ARTERIES OF EXTREMITIES WITH INTERMITTENT CLAUDICATION, BILATERAL LEGS: ICD-10-CM

## 2023-04-21 LAB
BUN SERPL-MCNC: 8 MG/DL (ref 8–23)
CREAT SERPL-MCNC: 0.85 MG/DL (ref 0.57–1)
EGFRCR SERPLBLD CKD-EPI 2021: 78.5 ML/MIN/1.73

## 2023-04-21 PROCEDURE — 82565 ASSAY OF CREATININE: CPT

## 2023-04-21 PROCEDURE — 84520 ASSAY OF UREA NITROGEN: CPT

## 2023-04-21 PROCEDURE — 36415 COLL VENOUS BLD VENIPUNCTURE: CPT

## 2023-04-24 ENCOUNTER — HOSPITAL ENCOUNTER (OUTPATIENT)
Dept: CT IMAGING | Facility: HOSPITAL | Age: 63
Discharge: HOME | End: 2023-04-24
Admitting: THORACIC SURGERY (CARDIOTHORACIC VASCULAR SURGERY)
Payer: MEDICARE

## 2023-04-24 PROCEDURE — 25510000001 IOPAMIDOL PER 1 ML: Performed by: THORACIC SURGERY (CARDIOTHORACIC VASCULAR SURGERY)

## 2023-04-24 PROCEDURE — 75635 CT ANGIO ABDOMINAL ARTERIES: CPT

## 2023-04-24 RX ADMIN — IOPAMIDOL 220 ML: 755 INJECTION, SOLUTION INTRAVENOUS at 11:13

## 2023-04-27 ENCOUNTER — OFFICE VISIT (OUTPATIENT)
Dept: CARDIAC SURGERY | Facility: CLINIC | Age: 63
End: 2023-04-27
Payer: MEDICARE

## 2023-04-27 VITALS
HEIGHT: 67 IN | HEART RATE: 70 BPM | OXYGEN SATURATION: 100 % | TEMPERATURE: 96.9 F | DIASTOLIC BLOOD PRESSURE: 72 MMHG | WEIGHT: 253.2 LBS | BODY MASS INDEX: 39.74 KG/M2 | SYSTOLIC BLOOD PRESSURE: 118 MMHG

## 2023-04-27 DIAGNOSIS — I10 BENIGN ESSENTIAL HTN: ICD-10-CM

## 2023-04-27 DIAGNOSIS — I73.9 PVD (PERIPHERAL VASCULAR DISEASE): Primary | ICD-10-CM

## 2023-04-27 DIAGNOSIS — F12.20 TETRAHYDROCANNABINOL (THC) USE DISORDER, MODERATE, DEPENDENCE: ICD-10-CM

## 2023-04-27 DIAGNOSIS — E66.01 CLASS 2 SEVERE OBESITY DUE TO EXCESS CALORIES WITH SERIOUS COMORBIDITY AND BODY MASS INDEX (BMI) OF 39.0 TO 39.9 IN ADULT: ICD-10-CM

## 2023-04-27 DIAGNOSIS — F43.10 POST TRAUMATIC STRESS DISORDER (PTSD): ICD-10-CM

## 2023-04-27 DIAGNOSIS — I70.211 ATHEROSCLEROSIS OF NATIVE ARTERIES OF EXTREMITIES WITH INTERMITTENT CLAUDICATION, RIGHT LEG: ICD-10-CM

## 2023-04-27 RX ORDER — SODIUM CHLORIDE 9 MG/ML
40 INJECTION, SOLUTION INTRAVENOUS AS NEEDED
OUTPATIENT
Start: 2023-04-27

## 2023-04-27 RX ORDER — SODIUM CHLORIDE 9 MG/ML
100 INJECTION, SOLUTION INTRAVENOUS CONTINUOUS
OUTPATIENT
Start: 2023-04-27

## 2023-04-27 RX ORDER — CYCLOBENZAPRINE HCL 10 MG
10 TABLET ORAL 3 TIMES DAILY PRN
COMMUNITY

## 2023-04-27 RX ORDER — HYDROXYZINE HYDROCHLORIDE 25 MG/1
25 TABLET, FILM COATED ORAL 3 TIMES DAILY PRN
COMMUNITY

## 2023-04-27 RX ORDER — SODIUM CHLORIDE 0.9 % (FLUSH) 0.9 %
10 SYRINGE (ML) INJECTION EVERY 12 HOURS SCHEDULED
OUTPATIENT
Start: 2023-04-27

## 2023-04-27 RX ORDER — SODIUM CHLORIDE 0.9 % (FLUSH) 0.9 %
10 SYRINGE (ML) INJECTION AS NEEDED
OUTPATIENT
Start: 2023-04-27

## 2023-04-27 NOTE — PROGRESS NOTES
"2023    Talya Schumacher  1960    Chief Complaint:  PVD    HPI:      PCP:  Nora Sims APRN  Cardiology:  Dr Montanez    62 y.o. female with HTN(stable, increased risk stroke, rupture) and Obesity(uncontrolled, increased risk cardiovascular events) , PTSD(stable), PVD(new, chronic severe progression, increase risk cardiovascular events).  former smoker and quit 3/2023.  Moderate RIGHT leg pain with walking 100ft x 1 year.  No TIA stroke amaurosis.  No MI claudication. No other associated signs, symptoms or modifying factors.    2023 MINDY:  RIGHT .62 biphasic.  LEFT .90 triphasic.  2023 CTA Aorta runoff:  RIGHT external iliac 80%, 3v runoff.  LEFT SFA mild irregularities, 3v runoff.    2022 ECG:  NSR 87, QTc 471    The following portions of the patient's history were reviewed and updated as appropriate: allergies, current medications, past family history, past medical history, past social history, past surgical history and problem list.  Recent images independently reviewed.  Available laboratory values reviewed.    PMH:  Past Medical History:   Diagnosis Date   • Class 3 severe obesity with body mass index (BMI) of 45.0 to 49.9 in adult    • Hypertension      Past Surgical History:   Procedure Laterality Date   • COLONOSCOPY     • DILATATION AND CURETTAGE      x3 surgical    • JOINT REPLACEMENT     • LAPAROSCOPIC CHOLECYSTECTOMY     • POSTPARTUM TUBAL LIGATION     • TOTAL LAPAROSCOPIC HYSTERECTOMY SALPINGO OOPHORECTOMY N/A 2022    Procedure: TOTAL LAPAROSCOPIC HYSTERECTOMY, BILATERAL SALPINGO OOPHORECTOMY, CYSTOSCOPY;  Surgeon: Lucia Golden DO;  Location: [facility];  Service: Gynecology;  Laterality: N/A;   • UMBILICAL HERNIA REPAIR      unsure mesh, \"gave me a belly button\", was child     No family history on file.  Social History     Tobacco Use   • Smoking status: Former     Packs/day: 0.50     Types: Cigarettes     Quit date: 3/27/2023     Years since quittin.0     Passive " "exposure: Past   • Smokeless tobacco: Never   Substance Use Topics   • Alcohol use: Yes     Comment: <1 drink per week   • Drug use: Yes     Types: Marijuana     Comment: NIGHTLY FOR SLEEP PER PATIENT       ALLERGIES:  Allergies   Allergen Reactions   • Contrast Dye (Echo Or Unknown Ct/Mr) Rash         MEDICATIONS:    Current Outpatient Medications:   •  amLODIPine (NORVASC) 10 MG tablet, Take 1 tablet by mouth Daily. Indications: High Blood Pressure Disorder, Disp: , Rfl:   •  cyclobenzaprine (FLEXERIL) 10 MG tablet, Take 1 tablet by mouth 3 (Three) Times a Day As Needed for Muscle Spasms., Disp: , Rfl:   •  hydrOXYzine (ATARAX) 25 MG tablet, Take 1 tablet by mouth 3 (Three) Times a Day As Needed for Itching., Disp: , Rfl:   •  losartan (COZAAR) 25 MG tablet, Take 1 tablet by mouth Daily., Disp: , Rfl:     Review of Systems   Review of Systems   Constitutional: Positive for malaise/fatigue. Negative for weight loss.   Cardiovascular: Positive for claudication. Negative for chest pain and dyspnea on exertion.   Respiratory: Negative for cough and shortness of breath.    Skin: Negative for color change and poor wound healing.   Neurological: Negative for dizziness, numbness and weakness.   Psychiatric/Behavioral: The patient is nervous/anxious.        Physical Exam   Vitals:    04/27/23 1400 04/27/23 1401   BP: 120/74 118/72   BP Location: Left arm Right arm   Pulse: 70    Temp: 96.9 °F (36.1 °C)    TempSrc: Infrared    SpO2: 100%    Weight: 115 kg (253 lb 3.2 oz)    Height: 170.2 cm (67\")      Body surface area is 2.24 meters squared.  Body mass index is 39.66 kg/m².  Physical Exam  Constitutional:       General: She is not in acute distress.     Appearance: She is not ill-appearing.   HENT:      Right Ear: Hearing normal.      Left Ear: Hearing normal.      Nose: No nasal deformity.      Mouth/Throat:      Dentition: Normal dentition. Does not have dentures.   Cardiovascular:      Rate and Rhythm: Normal rate and " regular rhythm.      Pulses:           Carotid pulses are 2+ on the right side and 2+ on the left side.       Radial pulses are 2+ on the right side and 2+ on the left side.        Dorsalis pedis pulses are 1+ on the right side and 2+ on the left side.        Posterior tibial pulses are 1+ on the right side and 2+ on the left side.      Heart sounds: No murmur heard.  Pulmonary:      Effort: Pulmonary effort is normal.      Breath sounds: Normal breath sounds.   Abdominal:      General: There is no distension.      Palpations: Abdomen is soft. There is no mass.      Tenderness: There is no abdominal tenderness.   Musculoskeletal:         General: No deformity.      Comments: Gait normal.    Skin:     General: Skin is warm and dry.      Coloration: Skin is not pale.      Findings: No erythema.      Comments: No venous staining   Neurological:      Mental Status: She is alert and oriented to person, place, and time.   Psychiatric:         Speech: Speech normal.         Behavior: Behavior is cooperative.         Thought Content: Thought content normal.         Judgment: Judgment normal.         BUN   Date Value Ref Range Status   04/21/2023 8 8 - 23 mg/dL Final     Creatinine   Date Value Ref Range Status   04/21/2023 0.85 0.57 - 1.00 mg/dL Final     eGFR   Amer   Date Value Ref Range Status   02/25/2021 81 >60 mL/min/1.73 Final       ASSESSMENT:  Diagnoses and all orders for this visit:    1. PVD (peripheral vascular disease) (Primary)  -     Case Request; Standing  -     sodium chloride 0.9 % infusion  -     sodium chloride 0.9 % flush 10 mL  -     sodium chloride 0.9 % flush 10 mL  -     sodium chloride 0.9 % infusion 40 mL  -     Case Request    2. Atherosclerosis of native arteries of extremities with intermittent claudication, right leg    3. Post traumatic stress disorder (PTSD)    4. Tetrahydrocannabinol (THC) use disorder, moderate     5. Benign essential HTN    6. Class 2 severe obesity due to excess  calories with serious comorbidity and body mass index (BMI) of 39.0 to 39.9 in adult    Other orders  -     Provide NPO Instructions to Patient; Future  -     Chlorhexidine Skin Prep; Future  -     Obtain informed consent; Standing  -     Chlorhexidine Skin Prep; Standing  -     Clip bilateral groins; Standing  -     Insert Peripheral IV; Standing  -     Saline Lock & Maintain IV Access; Standing      PLAN:  Detailed discussion with Talya Schumacher regarding situation, options and plans.  Severe lifestyle limiting claudication.  Noninvasive studies indicate severe peripheral vascular disease.  Requires additional urgent evaluation with arteriography to evaluate options for improving blood flow to feet, healing wounds and avoiding limb loss.    Risks, including but not limited to:  pain, infection, bleeding, renal failure (dialysis), nerve or blood vessel injury, need for emergent open operation to restore blood flow.  Benefits: relief of symptoms, reduction in risk of limb loss, improved wound healing.  Options:  Medical therapy, alternative imaging discussed.  Understands and wishes to proceed with plan.    Abdominal Aortogram, bilateral lower extremity runoff, possible balloon angioplasty, thrombolysis, atherectomy or stent.  Local/IV sedation.  SDS.  5/19/2023    Return after above studies complete  Obesity Class  2. Increased risk cardiovascular events, sleep and breathing disorders, joint issues, type 2 diabetes mellitus. Options for weight management, heart healthy diet, exercise programs, and associated health risks of obesity discussed.  Recommended regular physical activity, progressive walking program.  Continue current medications as directed.  Will Obtain relevant old records.  Smoking cessation advised and assistance options offered including behavioral counseling ([employer] [facility]), Nicotine replacement therapy (patches or gum), pharmacologic therapy (Chantix, Wellbutrin).  patient understands that continued use of tobacco products increases risk of heart disease, stroke, cancer; counseling for 3-5min.  Advance Care Planning   ACP discussion was declined by the patient. Patient does not have an advance directive, declines further assistance.     Thank you for the opportunity to participate in this patient's care.    Copy to primary care provider.

## 2023-04-27 NOTE — H&P (VIEW-ONLY)
"2023    Talya Schumacher  1960    Chief Complaint:  PVD    HPI:      PCP:  Nora Sims APRN  Cardiology:  Dr Montanez    62 y.o. female with HTN(stable, increased risk stroke, rupture) and Obesity(uncontrolled, increased risk cardiovascular events) , PTSD(stable), PVD(new, chronic severe progression, increase risk cardiovascular events).  former smoker and quit 3/2023.  Moderate RIGHT leg pain with walking 100ft x 1 year.  No TIA stroke amaurosis.  No MI claudication. No other associated signs, symptoms or modifying factors.    2023 MINDY:  RIGHT .62 biphasic.  LEFT .90 triphasic.  2023 CTA Aorta runoff:  RIGHT external iliac 80%, 3v runoff.  LEFT SFA mild irregularities, 3v runoff.    2022 ECG:  NSR 87, QTc 471    The following portions of the patient's history were reviewed and updated as appropriate: allergies, current medications, past family history, past medical history, past social history, past surgical history and problem list.  Recent images independently reviewed.  Available laboratory values reviewed.    PMH:  Past Medical History:   Diagnosis Date   • Class 3 severe obesity with body mass index (BMI) of 45.0 to 49.9 in adult    • Hypertension      Past Surgical History:   Procedure Laterality Date   • COLONOSCOPY     • DILATATION AND CURETTAGE      x3 surgical    • JOINT REPLACEMENT     • LAPAROSCOPIC CHOLECYSTECTOMY     • POSTPARTUM TUBAL LIGATION     • TOTAL LAPAROSCOPIC HYSTERECTOMY SALPINGO OOPHORECTOMY N/A 2022    Procedure: TOTAL LAPAROSCOPIC HYSTERECTOMY, BILATERAL SALPINGO OOPHORECTOMY, CYSTOSCOPY;  Surgeon: Lucia Golden DO;  Location: [facility];  Service: Gynecology;  Laterality: N/A;   • UMBILICAL HERNIA REPAIR      unsure mesh, \"gave me a belly button\", was child     No family history on file.  Social History     Tobacco Use   • Smoking status: Former     Packs/day: 0.50     Types: Cigarettes     Quit date: 3/27/2023     Years since quittin.0     Passive " "exposure: Past   • Smokeless tobacco: Never   Substance Use Topics   • Alcohol use: Yes     Comment: <1 drink per week   • Drug use: Yes     Types: Marijuana     Comment: NIGHTLY FOR SLEEP PER PATIENT       ALLERGIES:  Allergies   Allergen Reactions   • Contrast Dye (Echo Or Unknown Ct/Mr) Rash         MEDICATIONS:    Current Outpatient Medications:   •  amLODIPine (NORVASC) 10 MG tablet, Take 1 tablet by mouth Daily. Indications: High Blood Pressure Disorder, Disp: , Rfl:   •  cyclobenzaprine (FLEXERIL) 10 MG tablet, Take 1 tablet by mouth 3 (Three) Times a Day As Needed for Muscle Spasms., Disp: , Rfl:   •  hydrOXYzine (ATARAX) 25 MG tablet, Take 1 tablet by mouth 3 (Three) Times a Day As Needed for Itching., Disp: , Rfl:   •  losartan (COZAAR) 25 MG tablet, Take 1 tablet by mouth Daily., Disp: , Rfl:     Review of Systems   Review of Systems   Constitutional: Positive for malaise/fatigue. Negative for weight loss.   Cardiovascular: Positive for claudication. Negative for chest pain and dyspnea on exertion.   Respiratory: Negative for cough and shortness of breath.    Skin: Negative for color change and poor wound healing.   Neurological: Negative for dizziness, numbness and weakness.   Psychiatric/Behavioral: The patient is nervous/anxious.        Physical Exam   Vitals:    04/27/23 1400 04/27/23 1401   BP: 120/74 118/72   BP Location: Left arm Right arm   Pulse: 70    Temp: 96.9 °F (36.1 °C)    TempSrc: Infrared    SpO2: 100%    Weight: 115 kg (253 lb 3.2 oz)    Height: 170.2 cm (67\")      Body surface area is 2.24 meters squared.  Body mass index is 39.66 kg/m².  Physical Exam  Constitutional:       General: She is not in acute distress.     Appearance: She is not ill-appearing.   HENT:      Right Ear: Hearing normal.      Left Ear: Hearing normal.      Nose: No nasal deformity.      Mouth/Throat:      Dentition: Normal dentition. Does not have dentures.   Cardiovascular:      Rate and Rhythm: Normal rate and " regular rhythm.      Pulses:           Carotid pulses are 2+ on the right side and 2+ on the left side.       Radial pulses are 2+ on the right side and 2+ on the left side.        Dorsalis pedis pulses are 1+ on the right side and 2+ on the left side.        Posterior tibial pulses are 1+ on the right side and 2+ on the left side.      Heart sounds: No murmur heard.  Pulmonary:      Effort: Pulmonary effort is normal.      Breath sounds: Normal breath sounds.   Abdominal:      General: There is no distension.      Palpations: Abdomen is soft. There is no mass.      Tenderness: There is no abdominal tenderness.   Musculoskeletal:         General: No deformity.      Comments: Gait normal.    Skin:     General: Skin is warm and dry.      Coloration: Skin is not pale.      Findings: No erythema.      Comments: No venous staining   Neurological:      Mental Status: She is alert and oriented to person, place, and time.   Psychiatric:         Speech: Speech normal.         Behavior: Behavior is cooperative.         Thought Content: Thought content normal.         Judgment: Judgment normal.         BUN   Date Value Ref Range Status   04/21/2023 8 8 - 23 mg/dL Final     Creatinine   Date Value Ref Range Status   04/21/2023 0.85 0.57 - 1.00 mg/dL Final     eGFR   Amer   Date Value Ref Range Status   02/25/2021 81 >60 mL/min/1.73 Final       ASSESSMENT:  Diagnoses and all orders for this visit:    1. PVD (peripheral vascular disease) (Primary)  -     Case Request; Standing  -     sodium chloride 0.9 % infusion  -     sodium chloride 0.9 % flush 10 mL  -     sodium chloride 0.9 % flush 10 mL  -     sodium chloride 0.9 % infusion 40 mL  -     Case Request    2. Atherosclerosis of native arteries of extremities with intermittent claudication, right leg    3. Post traumatic stress disorder (PTSD)    4. Tetrahydrocannabinol (THC) use disorder, moderate     5. Benign essential HTN    6. Class 2 severe obesity due to excess  calories with serious comorbidity and body mass index (BMI) of 39.0 to 39.9 in adult    Other orders  -     Provide NPO Instructions to Patient; Future  -     Chlorhexidine Skin Prep; Future  -     Obtain informed consent; Standing  -     Chlorhexidine Skin Prep; Standing  -     Clip bilateral groins; Standing  -     Insert Peripheral IV; Standing  -     Saline Lock & Maintain IV Access; Standing      PLAN:  Detailed discussion with Talya Schumacher regarding situation, options and plans.  Severe lifestyle limiting claudication.  Noninvasive studies indicate severe peripheral vascular disease.  Requires additional urgent evaluation with arteriography to evaluate options for improving blood flow to feet, healing wounds and avoiding limb loss.    Risks, including but not limited to:  pain, infection, bleeding, renal failure (dialysis), nerve or blood vessel injury, need for emergent open operation to restore blood flow.  Benefits: relief of symptoms, reduction in risk of limb loss, improved wound healing.  Options:  Medical therapy, alternative imaging discussed.  Understands and wishes to proceed with plan.    Abdominal Aortogram, bilateral lower extremity runoff, possible balloon angioplasty, thrombolysis, atherectomy or stent.  Local/IV sedation.  SDS.  5/19/2023    Return after above studies complete  Obesity Class  2. Increased risk cardiovascular events, sleep and breathing disorders, joint issues, type 2 diabetes mellitus. Options for weight management, heart healthy diet, exercise programs, and associated health risks of obesity discussed.  Recommended regular physical activity, progressive walking program.  Continue current medications as directed.  Will Obtain relevant old records.  Smoking cessation advised and assistance options offered including behavioral counseling ([employer] [facility] [facility]essation [facility]), Nicotine replacement therapy (patches or gum), pharmacologic therapy (Chantix, Wellbutrin).  patient understands that continued use of tobacco products increases risk of heart disease, stroke, cancer; counseling for 3-5min.  Advance Care Planning   ACP discussion was declined by the patient. Patient does not have an advance directive, declines further assistance.     Thank you for the opportunity to participate in this patient's care.    Copy to primary care provider.

## 2023-04-27 NOTE — LETTER
2023     SOPHIA Randall  [address] [address]    Patient: Talya Schumacher   YOB: 1960   Date of Visit: 2023       Dear SOPHIA Waddell:    Thank you for referring Talya Schumacher to me for evaluation. Below are the relevant portions of my assessment and plan of care.    If you have questions, please do not hesitate to call me. I look forward to following Talya along with you.         Sincerely,        Rashi Todd MD        CC: No Recipients    Rashi Todd MD  23 1512  Sign when Signing Visit  2023    Talya Schumacher  1960    Chief Complaint:  PVD    HPI:      PCP:  Nora Sims APRN  Cardiology:  Dr Montanez    62 y.o. female with HTN(stable, increased risk stroke, rupture) and Obesity(uncontrolled, increased risk cardiovascular events) , PTSD(stable), PVD(new, chronic severe progression, increase risk cardiovascular events).  former smoker and quit 3/2023.  Moderate RIGHT leg pain with walking 100ft x 1 year.  No TIA stroke amaurosis.  No MI claudication. No other associated signs, symptoms or modifying factors.    2023 MINDY:  RIGHT .62 biphasic.  LEFT .90 triphasic.  2023 CTA Aorta runoff:  RIGHT external iliac 80%, 3v runoff.  LEFT SFA mild irregularities, 3v runoff.    2022 ECG:  NSR 87, QTc 471    The following portions of the patient's history were reviewed and updated as appropriate: allergies, current medications, past family history, past medical history, past social history, past surgical history and problem list.  Recent images independently reviewed.  Available laboratory values reviewed.    PMH:  Past Medical History:   Diagnosis Date   • Class 3 severe obesity with body mass index (BMI) of 45.0 to 49.9 in adult    • Hypertension      Past Surgical History:   Procedure Laterality Date   • COLONOSCOPY     • DILATATION AND CURETTAGE      x3 surgical    • JOINT REPLACEMENT     •  "LAPAROSCOPIC CHOLECYSTECTOMY     • POSTPARTUM TUBAL LIGATION     • TOTAL LAPAROSCOPIC HYSTERECTOMY SALPINGO OOPHORECTOMY N/A 2022    Procedure: TOTAL LAPAROSCOPIC HYSTERECTOMY, BILATERAL SALPINGO OOPHORECTOMY, CYSTOSCOPY;  Surgeon: Lucia Golden DO;  Location: [facility];  Service: Gynecology;  Laterality: N/A;   • UMBILICAL HERNIA REPAIR      unsure mesh, \"gave me a belly button\", was child     No family history on file.  Social History     Tobacco Use   • Smoking status: Former     Packs/day: 0.50     Types: Cigarettes     Quit date: 3/27/2023     Years since quittin.0     Passive exposure: Past   • Smokeless tobacco: Never   Substance Use Topics   • Alcohol use: Yes     Comment: <1 drink per week   • Drug use: Yes     Types: Marijuana     Comment: NIGHTLY FOR SLEEP PER PATIENT       ALLERGIES:  Allergies   Allergen Reactions   • Contrast Dye (Echo Or Unknown Ct/Mr) Rash         MEDICATIONS:    Current Outpatient Medications:   •  amLODIPine (NORVASC) 10 MG tablet, Take 1 tablet by mouth Daily. Indications: High Blood Pressure Disorder, Disp: , Rfl:   •  cyclobenzaprine (FLEXERIL) 10 MG tablet, Take 1 tablet by mouth 3 (Three) Times a Day As Needed for Muscle Spasms., Disp: , Rfl:   •  hydrOXYzine (ATARAX) 25 MG tablet, Take 1 tablet by mouth 3 (Three) Times a Day As Needed for Itching., Disp: , Rfl:   •  losartan (COZAAR) 25 MG tablet, Take 1 tablet by mouth Daily., Disp: , Rfl:     Review of Systems   Review of Systems   Constitutional: Positive for malaise/fatigue. Negative for weight loss.   Cardiovascular: Positive for claudication. Negative for chest pain and dyspnea on exertion.   Respiratory: Negative for cough and shortness of breath.    Skin: Negative for color change and poor wound healing.   Neurological: Negative for dizziness, numbness and weakness.   Psychiatric/Behavioral: The patient is nervous/anxious.        Physical Exam   Vitals:    23 1400 23 1401   BP: 120/74 118/72   BP " "Location: Left arm Right arm   Pulse: 70    Temp: 96.9 °F (36.1 °C)    TempSrc: Infrared    SpO2: 100%    Weight: 115 kg (253 lb 3.2 oz)    Height: 170.2 cm (67\")      Body surface area is 2.24 meters squared.  Body mass index is 39.66 kg/m².  Physical Exam  Constitutional:       General: She is not in acute distress.     Appearance: She is not ill-appearing.   HENT:      Right Ear: Hearing normal.      Left Ear: Hearing normal.      Nose: No nasal deformity.      Mouth/Throat:      Dentition: Normal dentition. Does not have dentures.   Cardiovascular:      Rate and Rhythm: Normal rate and regular rhythm.      Pulses:           Carotid pulses are 2+ on the right side and 2+ on the left side.       Radial pulses are 2+ on the right side and 2+ on the left side.        Dorsalis pedis pulses are 1+ on the right side and 2+ on the left side.        Posterior tibial pulses are 1+ on the right side and 2+ on the left side.      Heart sounds: No murmur heard.  Pulmonary:      Effort: Pulmonary effort is normal.      Breath sounds: Normal breath sounds.   Abdominal:      General: There is no distension.      Palpations: Abdomen is soft. There is no mass.      Tenderness: There is no abdominal tenderness.   Musculoskeletal:         General: No deformity.      Comments: Gait normal.    Skin:     General: Skin is warm and dry.      Coloration: Skin is not pale.      Findings: No erythema.      Comments: No venous staining   Neurological:      Mental Status: She is alert and oriented to person, place, and time.   Psychiatric:         Speech: Speech normal.         Behavior: Behavior is cooperative.         Thought Content: Thought content normal.         Judgment: Judgment normal.         BUN   Date Value Ref Range Status   04/21/2023 8 8 - 23 mg/dL Final     Creatinine   Date Value Ref Range Status   04/21/2023 0.85 0.57 - 1.00 mg/dL Final     eGFR   Amer   Date Value Ref Range Status   02/25/2021 81 >60 mL/min/1.73 " Final       ASSESSMENT:  Diagnoses and all orders for this visit:    1. PVD (peripheral vascular disease) (Primary)  -     Case Request; Standing  -     sodium chloride 0.9 % infusion  -     sodium chloride 0.9 % flush 10 mL  -     sodium chloride 0.9 % flush 10 mL  -     sodium chloride 0.9 % infusion 40 mL  -     Case Request    2. Atherosclerosis of native arteries of extremities with intermittent claudication, right leg    3. Post traumatic stress disorder (PTSD)    4. Tetrahydrocannabinol (THC) use disorder, moderate     5. Benign essential HTN    6. Class 2 severe obesity due to excess calories with serious comorbidity and body mass index (BMI) of 39.0 to 39.9 in adult    Other orders  -     Provide NPO Instructions to Patient; Future  -     Chlorhexidine Skin Prep; Future  -     Obtain informed consent; Standing  -     Chlorhexidine Skin Prep; Standing  -     Clip bilateral groins; Standing  -     Insert Peripheral IV; Standing  -     Saline Lock & Maintain IV Access; Standing      PLAN:  Detailed discussion with Talya Schumacher regarding situation, options and plans.  Severe lifestyle limiting claudication.  Noninvasive studies indicate severe peripheral vascular disease.  Requires additional urgent evaluation with arteriography to evaluate options for improving blood flow to feet, healing wounds and avoiding limb loss.    Risks, including but not limited to:  pain, infection, bleeding, renal failure (dialysis), nerve or blood vessel injury, need for emergent open operation to restore blood flow.  Benefits: relief of symptoms, reduction in risk of limb loss, improved wound healing.  Options:  Medical therapy, alternative imaging discussed.  Understands and wishes to proceed with plan.    Abdominal Aortogram, bilateral lower extremity runoff, possible balloon angioplasty, thrombolysis, atherectomy or stent.  Local/IV sedation.  SDS.  5/19/2023    Return after above studies complete  Obesity Class  2.  Increased risk cardiovascular events, sleep and breathing disorders, joint issues, type 2 diabetes mellitus. Options for weight management, heart healthy diet, exercise programs, and associated health risks of obesity discussed.  Recommended regular physical activity, progressive walking program.  Continue current medications as directed.  Will Obtain relevant old records.  Smoking cessation advised and assistance options offered including behavioral counseling ([employer] [facility]), Nicotine replacement therapy (patches or gum), pharmacologic therapy (Chantix, Wellbutrin). patient understands that continued use of tobacco products increases risk of heart disease, stroke, cancer; counseling for 3-5min.  Advance Care Planning   ACP discussion was declined by the patient. Patient does not have an advance directive, declines further assistance.    Thank you for the opportunity to participate in this patient's care.    Copy to primary care provider.

## 2023-05-19 ENCOUNTER — APPOINTMENT (OUTPATIENT)
Dept: ULTRASOUND IMAGING | Facility: HOSPITAL | Age: 63
End: 2023-05-19
Payer: MEDICARE

## 2023-05-19 ENCOUNTER — HOSPITAL ENCOUNTER (OUTPATIENT)
Facility: HOSPITAL | Age: 63
Setting detail: HOSPITAL OUTPATIENT SURGERY
Discharge: HOME | End: 2023-05-19
Attending: THORACIC SURGERY (CARDIOTHORACIC VASCULAR SURGERY) | Admitting: THORACIC SURGERY (CARDIOTHORACIC VASCULAR SURGERY)
Payer: MEDICARE

## 2023-05-19 VITALS
WEIGHT: 241.4 LBS | RESPIRATION RATE: 20 BRPM | HEIGHT: 66 IN | SYSTOLIC BLOOD PRESSURE: 131 MMHG | TEMPERATURE: 97.8 F | DIASTOLIC BLOOD PRESSURE: 97 MMHG | OXYGEN SATURATION: 96 % | HEART RATE: 75 BPM | BODY MASS INDEX: 38.8 KG/M2

## 2023-05-19 DIAGNOSIS — I73.9 PVD (PERIPHERAL VASCULAR DISEASE): ICD-10-CM

## 2023-05-19 PROCEDURE — C2623 CATH, TRANSLUMIN, DRUG-COAT: HCPCS | Performed by: THORACIC SURGERY (CARDIOTHORACIC VASCULAR SURGERY)

## 2023-05-19 PROCEDURE — C1894 INTRO/SHEATH, NON-LASER: HCPCS | Performed by: THORACIC SURGERY (CARDIOTHORACIC VASCULAR SURGERY)

## 2023-05-19 PROCEDURE — 76937 US GUIDE VASCULAR ACCESS: CPT

## 2023-05-19 PROCEDURE — 25510000001 IOPAMIDOL 61 % SOLUTION: Performed by: THORACIC SURGERY (CARDIOTHORACIC VASCULAR SURGERY)

## 2023-05-19 PROCEDURE — C1887 CATHETER, GUIDING: HCPCS | Performed by: THORACIC SURGERY (CARDIOTHORACIC VASCULAR SURGERY)

## 2023-05-19 PROCEDURE — 75716 ARTERY X-RAYS ARMS/LEGS: CPT | Performed by: THORACIC SURGERY (CARDIOTHORACIC VASCULAR SURGERY)

## 2023-05-19 PROCEDURE — 25010000002 DIPHENHYDRAMINE PER 50 MG: Performed by: THORACIC SURGERY (CARDIOTHORACIC VASCULAR SURGERY)

## 2023-05-19 PROCEDURE — 25010000002 HEPARIN (PORCINE) PER 1000 UNITS: Performed by: THORACIC SURGERY (CARDIOTHORACIC VASCULAR SURGERY)

## 2023-05-19 PROCEDURE — 25010000002 FENTANYL CITRATE (PF) 50 MCG/ML SOLUTION: Performed by: THORACIC SURGERY (CARDIOTHORACIC VASCULAR SURGERY)

## 2023-05-19 PROCEDURE — 25010000002 MIDAZOLAM PER 1 MG: Performed by: THORACIC SURGERY (CARDIOTHORACIC VASCULAR SURGERY)

## 2023-05-19 PROCEDURE — C1769 GUIDE WIRE: HCPCS | Performed by: THORACIC SURGERY (CARDIOTHORACIC VASCULAR SURGERY)

## 2023-05-19 PROCEDURE — 25010000002 METHYLPREDNISOLONE PER 125 MG: Performed by: THORACIC SURGERY (CARDIOTHORACIC VASCULAR SURGERY)

## 2023-05-19 PROCEDURE — C1877 STENT, NON-COAT/COV W/O DEL: HCPCS | Performed by: THORACIC SURGERY (CARDIOTHORACIC VASCULAR SURGERY)

## 2023-05-19 PROCEDURE — C1725 CATH, TRANSLUMIN NON-LASER: HCPCS | Performed by: THORACIC SURGERY (CARDIOTHORACIC VASCULAR SURGERY)

## 2023-05-19 PROCEDURE — C1760 CLOSURE DEV, VASC: HCPCS | Performed by: THORACIC SURGERY (CARDIOTHORACIC VASCULAR SURGERY)

## 2023-05-19 DEVICE — BARD® E-LUMINEXX™ VASCULAR AND BILIARY STENT 10 MM X 60 MM (135 CM DELIVERY CATHETER)
Type: IMPLANTABLE DEVICE | Status: FUNCTIONAL
Brand: E-LUMINEXX® VASCULAR & BILIARY STENT

## 2023-05-19 RX ORDER — MIDAZOLAM HYDROCHLORIDE 1 MG/ML
INJECTION INTRAMUSCULAR; INTRAVENOUS AS NEEDED
Status: DISCONTINUED | OUTPATIENT
Start: 2023-05-19 | End: 2023-05-19 | Stop reason: HOSPADM

## 2023-05-19 RX ORDER — SODIUM CHLORIDE 0.9 % (FLUSH) 0.9 %
10 SYRINGE (ML) INJECTION EVERY 12 HOURS SCHEDULED
Status: DISCONTINUED | OUTPATIENT
Start: 2023-05-19 | End: 2023-05-22 | Stop reason: HOSPADM

## 2023-05-19 RX ORDER — FENTANYL CITRATE 50 UG/ML
INJECTION, SOLUTION INTRAMUSCULAR; INTRAVENOUS AS NEEDED
Status: DISCONTINUED | OUTPATIENT
Start: 2023-05-19 | End: 2023-05-19 | Stop reason: HOSPADM

## 2023-05-19 RX ORDER — CLOPIDOGREL BISULFATE 75 MG/1
75 TABLET ORAL DAILY
Qty: 30 TABLET | Refills: 11 | Status: SHIPPED | OUTPATIENT
Start: 2023-05-19 | End: 2024-05-18

## 2023-05-19 RX ORDER — ATORVASTATIN CALCIUM 20 MG/1
20 TABLET, FILM COATED ORAL DAILY
Qty: 30 TABLET | Refills: 11 | Status: SHIPPED | OUTPATIENT
Start: 2023-05-19 | End: 2024-05-18

## 2023-05-19 RX ORDER — ASPIRIN 81 MG/1
324 TABLET, CHEWABLE ORAL ONCE
Status: COMPLETED | OUTPATIENT
Start: 2023-05-19 | End: 2023-05-19

## 2023-05-19 RX ORDER — METHYLPREDNISOLONE SODIUM SUCCINATE 125 MG/2ML
INJECTION, POWDER, LYOPHILIZED, FOR SOLUTION INTRAMUSCULAR; INTRAVENOUS
Status: DISCONTINUED
Start: 2023-05-19 | End: 2023-05-19 | Stop reason: HOSPADM

## 2023-05-19 RX ORDER — FLUTICASONE PROPIONATE 50 MCG
1 SPRAY, SUSPENSION (ML) NASAL DAILY
COMMUNITY
Start: 2023-05-03

## 2023-05-19 RX ORDER — DIPHENHYDRAMINE HYDROCHLORIDE 50 MG/ML
INJECTION INTRAMUSCULAR; INTRAVENOUS AS NEEDED
Status: DISCONTINUED | OUTPATIENT
Start: 2023-05-19 | End: 2023-05-19 | Stop reason: HOSPADM

## 2023-05-19 RX ORDER — LIDOCAINE HYDROCHLORIDE 20 MG/ML
INJECTION, SOLUTION EPIDURAL; INFILTRATION; INTRACAUDAL; PERINEURAL
Status: DISCONTINUED
Start: 2023-05-19 | End: 2023-05-19 | Stop reason: HOSPADM

## 2023-05-19 RX ORDER — LIDOCAINE HYDROCHLORIDE 20 MG/ML
INJECTION, SOLUTION EPIDURAL; INFILTRATION; INTRACAUDAL; PERINEURAL AS NEEDED
Status: DISCONTINUED | OUTPATIENT
Start: 2023-05-19 | End: 2023-05-19 | Stop reason: HOSPADM

## 2023-05-19 RX ORDER — SODIUM CHLORIDE 0.9 % (FLUSH) 0.9 %
10 SYRINGE (ML) INJECTION AS NEEDED
Status: DISCONTINUED | OUTPATIENT
Start: 2023-05-19 | End: 2023-05-22 | Stop reason: HOSPADM

## 2023-05-19 RX ORDER — SODIUM CHLORIDE 9 MG/ML
40 INJECTION, SOLUTION INTRAVENOUS AS NEEDED
Status: DISCONTINUED | OUTPATIENT
Start: 2023-05-19 | End: 2023-05-22 | Stop reason: HOSPADM

## 2023-05-19 RX ORDER — METHYLPREDNISOLONE SODIUM SUCCINATE 125 MG/2ML
INJECTION, POWDER, LYOPHILIZED, FOR SOLUTION INTRAMUSCULAR; INTRAVENOUS AS NEEDED
Status: DISCONTINUED | OUTPATIENT
Start: 2023-05-19 | End: 2023-05-19 | Stop reason: HOSPADM

## 2023-05-19 RX ORDER — CLOPIDOGREL BISULFATE 75 MG/1
150 TABLET ORAL ONCE
Status: COMPLETED | OUTPATIENT
Start: 2023-05-19 | End: 2023-05-19

## 2023-05-19 RX ORDER — HEPARIN SODIUM 1000 [USP'U]/ML
INJECTION, SOLUTION INTRAVENOUS; SUBCUTANEOUS AS NEEDED
Status: DISCONTINUED | OUTPATIENT
Start: 2023-05-19 | End: 2023-05-19 | Stop reason: HOSPADM

## 2023-05-19 RX ORDER — DIPHENHYDRAMINE HYDROCHLORIDE 50 MG/ML
INJECTION INTRAMUSCULAR; INTRAVENOUS
Status: DISCONTINUED
Start: 2023-05-19 | End: 2023-05-19 | Stop reason: HOSPADM

## 2023-05-19 RX ORDER — ATORVASTATIN CALCIUM 20 MG/1
20 TABLET, FILM COATED ORAL ONCE
Status: COMPLETED | OUTPATIENT
Start: 2023-05-19 | End: 2023-05-19

## 2023-05-19 RX ORDER — HEPARIN SODIUM 1000 [USP'U]/ML
INJECTION, SOLUTION INTRAVENOUS; SUBCUTANEOUS
Status: DISCONTINUED
Start: 2023-05-19 | End: 2023-05-19 | Stop reason: HOSPADM

## 2023-05-19 RX ORDER — ACETAMINOPHEN 325 MG/1
650 TABLET ORAL EVERY 4 HOURS PRN
Status: DISCONTINUED | OUTPATIENT
Start: 2023-05-19 | End: 2023-05-22 | Stop reason: HOSPADM

## 2023-05-19 RX ORDER — MIDAZOLAM HYDROCHLORIDE 1 MG/ML
INJECTION INTRAMUSCULAR; INTRAVENOUS
Status: DISCONTINUED
Start: 2023-05-19 | End: 2023-05-19 | Stop reason: HOSPADM

## 2023-05-19 RX ORDER — FENTANYL CITRATE 50 UG/ML
INJECTION, SOLUTION INTRAMUSCULAR; INTRAVENOUS
Status: DISCONTINUED
Start: 2023-05-19 | End: 2023-05-19 | Stop reason: HOSPADM

## 2023-05-19 RX ORDER — SODIUM CHLORIDE 9 MG/ML
100 INJECTION, SOLUTION INTRAVENOUS CONTINUOUS
Status: DISCONTINUED | OUTPATIENT
Start: 2023-05-19 | End: 2023-05-22 | Stop reason: HOSPADM

## 2023-05-19 RX ADMIN — SODIUM CHLORIDE 100 ML/HR: 9 INJECTION, SOLUTION INTRAVENOUS at 07:52

## 2023-05-19 RX ADMIN — CLOPIDOGREL BISULFATE 150 MG: 75 TABLET ORAL at 12:39

## 2023-05-19 RX ADMIN — ASPIRIN 324 MG: 81 TABLET, CHEWABLE ORAL at 12:43

## 2023-05-19 RX ADMIN — ATORVASTATIN CALCIUM 20 MG: 20 TABLET, FILM COATED ORAL at 12:38

## 2023-05-19 NOTE — Clinical Note
Hemostasis started on the right femoral artery. Perclose was used in achieving hemostasis. Closure device deployed in the vessel.

## 2023-05-19 NOTE — Clinical Note
Prepped: groin. Prepped with: ChloraPrep and Hibiclens. The site was clipped. The patient was draped in a sterile fashion.

## 2023-05-26 ENCOUNTER — OFFICE VISIT (OUTPATIENT)
Dept: CARDIAC SURGERY | Facility: CLINIC | Age: 63
End: 2023-05-26
Payer: MEDICARE

## 2023-05-26 VITALS
OXYGEN SATURATION: 99 % | SYSTOLIC BLOOD PRESSURE: 132 MMHG | DIASTOLIC BLOOD PRESSURE: 84 MMHG | BODY MASS INDEX: 39.53 KG/M2 | TEMPERATURE: 97.8 F | HEART RATE: 78 BPM | WEIGHT: 246 LBS | HEIGHT: 66 IN

## 2023-05-26 DIAGNOSIS — Z95.828 PRESENCE OF ARTERIAL STENT: ICD-10-CM

## 2023-05-26 DIAGNOSIS — R60.0 EDEMA OF LEFT LOWER EXTREMITY: ICD-10-CM

## 2023-05-26 DIAGNOSIS — I73.9 PVD (PERIPHERAL VASCULAR DISEASE): ICD-10-CM

## 2023-05-26 DIAGNOSIS — I73.9 PVD (PERIPHERAL VASCULAR DISEASE) WITH CLAUDICATION: Primary | ICD-10-CM

## 2023-05-26 DIAGNOSIS — I10 BENIGN ESSENTIAL HTN: ICD-10-CM

## 2023-05-26 DIAGNOSIS — E78.2 MIXED HYPERLIPIDEMIA: ICD-10-CM

## 2023-05-26 DIAGNOSIS — E66.01 CLASS 2 SEVERE OBESITY DUE TO EXCESS CALORIES WITH SERIOUS COMORBIDITY AND BODY MASS INDEX (BMI) OF 39.0 TO 39.9 IN ADULT: ICD-10-CM

## 2023-05-26 NOTE — PROGRESS NOTES
2023    Talya Schumacher  1960    Chief Complaint:  PVD    HPI:      PCP:  Nora Sims APRN  Cardiology:  Dr Montanez    62 y.o. female with HTN(stable, increased risk stroke, rupture) and Obesity(uncontrolled, increased risk cardiovascular events) , PTSD(stable), PVD(new, chronic severe progression, increase risk cardiovascular events).  former smoker and quit 3/2023.  Moderate RIGHT leg pain with walking 100ft x 1 year.  No TIA stroke amaurosis.  No MI claudication.  Underwent arteriogram PTA stent of right external iliac returns today in postprocedural follow-up complaints of left lower extremity edema no other associated signs, symptoms or modifying factors.    2023 MINDY:  RIGHT .62 biphasic.  LEFT .90 triphasic.  2023 CTA Aorta runoff:  RIGHT external iliac 80%, 3v runoff.  LEFT SFA mild irregularities, 3v runoff.  2023 arteriogram: PTA stent right external iliac  2023 MINDY: Right 1.21 triphasic.  Left 1.24 triphasic    2022 ECG:  NSR 87, QTc 471    The following portions of the patient's history were reviewed and updated as appropriate: allergies, current medications, past family history, past medical history, past social history, past surgical history and problem list.  Recent images independently reviewed.  Available laboratory values reviewed.    PMH:  Past Medical History:   Diagnosis Date   • Class 3 severe obesity with body mass index (BMI) of 45.0 to 49.9 in adult    • Hypertension      Past Surgical History:   Procedure Laterality Date   • ARTERIOGRAM N/A 2023    Procedure: Arteriogram possible angioplasty stent lithotripsy thrombolysis;  Surgeon: Rashi Todd MD;  Location: [facility];  Service: Interventional Radiology;  Laterality: N/A;   • COLONOSCOPY     • DILATATION AND CURETTAGE      x3 surgical    • JOINT REPLACEMENT     • LAPAROSCOPIC CHOLECYSTECTOMY     • POSTPARTUM TUBAL LIGATION     • TOTAL LAPAROSCOPIC HYSTERECTOMY SALPINGO  "OOPHORECTOMY N/A 2022    Procedure: TOTAL LAPAROSCOPIC HYSTERECTOMY, BILATERAL SALPINGO OOPHORECTOMY, CYSTOSCOPY;  Surgeon: Lucia Golden DO;  Location: [facility];  Service: Gynecology;  Laterality: N/A;   • UMBILICAL HERNIA REPAIR      unsure mesh, \"gave me a belly button\", was child     History reviewed. No pertinent family history.  Social History     Tobacco Use   • Smoking status: Former     Packs/day: 0.50     Types: Cigarettes     Quit date: 3/27/2023     Years since quittin.1     Passive exposure: Past   • Smokeless tobacco: Never   Substance Use Topics   • Alcohol use: Yes     Comment: <1 drink per week   • Drug use: Yes     Types: Marijuana     Comment: NIGHTLY FOR SLEEP PER PATIENT       ALLERGIES:  Allergies   Allergen Reactions   • Contrast Dye (Echo Or Unknown Ct/Mr) Rash         MEDICATIONS:    Current Outpatient Medications:   •  amLODIPine (NORVASC) 10 MG tablet, Take 1 tablet by mouth Daily. Indications: High Blood Pressure Disorder, Disp: , Rfl:   •  aspirin 81 MG EC tablet, Take 1 tablet by mouth Daily., Disp: 150 tablet, Rfl: 2  •  atorvastatin (Lipitor) 20 MG tablet, Take 1 tablet by mouth Daily., Disp: 30 tablet, Rfl: 11  •  clopidogrel (Plavix) 75 MG tablet, Take 1 tablet by mouth Daily., Disp: 30 tablet, Rfl: 11  •  cyclobenzaprine (FLEXERIL) 10 MG tablet, Take 1 tablet by mouth 3 (Three) Times a Day As Needed for Muscle Spasms., Disp: , Rfl:   •  fluticasone (FLONASE) 50 MCG/ACT nasal spray, 1 spray by Each Nare route Daily., Disp: , Rfl:   •  losartan (COZAAR) 25 MG tablet, Take 1 tablet by mouth Daily., Disp: , Rfl:     Review of Systems   Review of Systems   Constitutional: Positive for malaise/fatigue. Negative for weight loss.   Cardiovascular: Positive for claudication. Negative for chest pain and dyspnea on exertion.   Respiratory: Negative for cough and shortness of breath.    Skin: Negative for color change and poor wound healing.   Neurological: Negative for dizziness, " "numbness and weakness.   Psychiatric/Behavioral: The patient is nervous/anxious.        Physical Exam   Vitals:    05/26/23 0923   BP: 132/84   BP Location: Left arm   Patient Position: Sitting   Cuff Size: Adult   Pulse: 78   Temp: 97.8 °F (36.6 °C)   SpO2: 99%   Weight: 112 kg (246 lb)   Height: 167.6 cm (66\")     Body surface area is 2.19 meters squared.  Body mass index is 39.71 kg/m².  Physical Exam  Constitutional:       General: She is not in acute distress.     Appearance: She is not ill-appearing.   HENT:      Right Ear: Hearing normal.      Left Ear: Hearing normal.      Nose: No nasal deformity.      Mouth/Throat:      Dentition: Normal dentition. Does not have dentures.   Cardiovascular:      Rate and Rhythm: Normal rate and regular rhythm.      Pulses:           Carotid pulses are 2+ on the right side and 2+ on the left side.       Radial pulses are 2+ on the right side and 2+ on the left side.        Dorsalis pedis pulses are 2+ on the right side and 2+ on the left side.        Posterior tibial pulses are 2+ on the right side and 2+ on the left side.      Heart sounds: No murmur heard.  Pulmonary:      Effort: Pulmonary effort is normal.      Breath sounds: Normal breath sounds.   Abdominal:      General: There is no distension.      Palpations: Abdomen is soft. There is no mass.      Tenderness: There is no abdominal tenderness.   Musculoskeletal:         General: No deformity.      Left lower leg: Edema present.      Comments: Gait normal.    Skin:     General: Skin is warm and dry.      Coloration: Skin is not pale.      Findings: No erythema.      Comments: No venous staining   Neurological:      Mental Status: She is alert and oriented to person, place, and time.   Psychiatric:         Speech: Speech normal.         Behavior: Behavior is cooperative.         Thought Content: Thought content normal.         Judgment: Judgment normal.         BUN   Date Value Ref Range Status   04/21/2023 8 8 - 23 " mg/dL Final     Creatinine   Date Value Ref Range Status   04/21/2023 0.85 0.57 - 1.00 mg/dL Final     eGFR   Amer   Date Value Ref Range Status   02/25/2021 81 >60 mL/min/1.73 Final       ASSESSMENT/PLAN    1. PVD (peripheral vascular disease) with claudication  Normal resting perfusion bilateral lower extremities.    Successful PTA stent right external iliac return in approximately 6 weeks with initial iliac ultrasound and MINDY.    Dual antiplatelet therapy, statin    - Doppler Ankle Brachial Index Single Level CAR; Future    2. Presence of arterial stent    - Duplex Aorta IVC Iliac Graft Limited CAR; Future    3. Benign essential HTN  The presence of PAD, CAD, or WILLA is evidence of cardiovascular target organ disease, acceleration of pharmacologic antihypertensive therapy with blood pressure goals of less than 140/90 is recommended.  ACE inhibition may increase pain-free walking distance in patients with symptomatic PAD.    4.  Left lower extremity edema  Recommend continued use of compression stockings 20-30mmHg daily, may remove at night.  Advised elevation of legs while at rest.  Encouraged daily exercise.     Obtain left lower extremity venous ultrasound to rule out GSV reflux    5. Class 2 severe obesity due to excess calories with serious comorbidity and body mass index (BMI) of 39.0 to 39.9 in adult  Body mass index is 39.71 kg/m². Class 2 obesity, We have discussed the benefits of weight loss as it relates to long term morbidity and disease prevention.  Interventions discussed included self-monitoring of caloric intake, increasing physical activity/exercise, goal setting, stimulus control, consultation with dietitian, and non-food rewards.      6. Mixed hyperlipidemia  Treatment of hyperlipidemia prevents the progression of peripheral vascular disease and atherosclerosis in multiple beds.  Lipid-lowering therapy may improve claudication distance.  Statins are also considered mandatory in patients  with PAD by virtue of reducing cardiovascular events as shown in the heart protection study.  Target LDL for PAD patients is less than 70 mg/dL.  Consideration of PCSK9 inhibitors and/or other adjuvant therapies and statin intolerant patients is recommended.  Continued follow up with PCP is recommended for patients on lipid lowering therapies.    Lab Results   Component Value Date    CHOL 213 (H) 02/26/2021    TRIG 131 02/26/2021    HDL 34 (L) 02/26/2021     (H) 02/26/2021           Advance Care Planning   ACP discussion was declined by the patient. Patient does not have an advance directive, declines further assistance.     Thank you for the opportunity to participate in this patient's care.    Copy to primary care provider.

## 2023-05-26 NOTE — PATIENT INSTRUCTIONS
Normal resting perfusion bilateral lower extremities.    Successful intervention.    Follow up vascular studies with stent duplex and MINDY in 6 weeks    Continue aspirin, statin, Plavix
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
## (undated) DEVICE — ENDOPATH XCEL BLADELESS TROCARS WITH STABILITY SLEEVES: Brand: ENDOPATH XCEL

## (undated) DEVICE — SUT MNCRYL 3/0 Y936H

## (undated) DEVICE — ELECTRODE,RT,STRESS,FOAM,50PK: Brand: MEDLINE

## (undated) DEVICE — ADHS SKIN PREMIERPRO EXOFIN TOPICAL HI/VISC .5ML

## (undated) DEVICE — PERCLOSE™ PROSTYLE™ SUTURE-MEDIATED CLOSURE AND REPAIR SYSTEM: Brand: PERCLOSE™ PROSTYLE™

## (undated) DEVICE — PATIENT RETURN ELECTRODE, SINGLE-USE, CONTACT QUALITY MONITORING, ADULT, WITH 9FT CORD, FOR PATIENTS WEIGING OVER 33LBS. (15KG): Brand: MEGADYNE

## (undated) DEVICE — KT INTRO MINISTICK MAX W/GW PALLADIUM ECHO 4F 21G 7CM

## (undated) DEVICE — VISUALIZATION SYSTEM: Brand: CLEARIFY

## (undated) DEVICE — ULTRAVERSE 035 PTA DILATATION CATHETER 9.0MM X 40MM BALLOON, 75CM SHAFT: Brand: ULTRAVERSE® 035 PTA DILATATION CATHETER

## (undated) DEVICE — CYSTO/BLADDER IRRIGATION SET, REGULATING CLAMP

## (undated) DEVICE — PENCL ES MEGADINE EZ/CLEAN BUTN W/HOLSTR 10FT

## (undated) DEVICE — RADIFOCUS GLIDEWIRE: Brand: GLIDEWIRE

## (undated) DEVICE — ULTRAVERSE 035 PTA DILATATION CATHETER 9.0MM X 60MM BALLOON, 75CM SHAFT: Brand: ULTRAVERSE® 035 PTA DILATATION CATHETER

## (undated) DEVICE — TBG HI PRESSURE 500PSI 20IN

## (undated) DEVICE — ELECTRD E/S MEGADYNE EZCLEAN L/WR LAP 5MM 33CM 1P/U

## (undated) DEVICE — SUTURING DEVICE: Brand: ENDO STITCH

## (undated) DEVICE — OCCL COLPO PNEUMO  STRL

## (undated) DEVICE — STERILE POLYISOPRENE POWDER-FREE SURGICAL GLOVES WITH EMOLLIENT COATING: Brand: PROTEXIS

## (undated) DEVICE — CORE TRUMPET FOR SINGLE SOLUTION BAG: Brand: CORE DYNAMICS

## (undated) DEVICE — DESTINATION RENAL GUIDING SHEATH: Brand: DESTINATION

## (undated) DEVICE — ENDOPATH PNEUMONEEDLE INSUFFLATION NEEDLES WITH LUER LOCK CONNECTORS 120MM: Brand: ENDOPATH

## (undated) DEVICE — MANIP UTER RUMI TP 6.7MMX8CM BLU

## (undated) DEVICE — SUT VIC 0 CT1 36IN J946H

## (undated) DEVICE — PAD HEMOST NEPTUNE 2X2IN

## (undated) DEVICE — SYR LL TP 10ML STRL

## (undated) DEVICE — GLV SURG SENSICARE PI ORTHO SZ6.5 LF STRL

## (undated) DEVICE — DRAPE UNDERBUTTOCKS W FLUID POUCH 40X44IN

## (undated) DEVICE — SOL IRR NACL 0.9PCT BT 1000ML

## (undated) DEVICE — SYR LUERLOK 50ML

## (undated) DEVICE — ENSEAL X1 TISSUE SEALER, CURVED JAW, 37 CM SHAFT LENGTH: Brand: ENSEAL

## (undated) DEVICE — APPL HEMOS FOR DELIVERY FLOSEAL

## (undated) DEVICE — CONTAINER,SPECIMEN,OR STERILE,4OZ: Brand: MEDLINE

## (undated) DEVICE — ABSORBABLE RELOAD
Type: IMPLANTABLE DEVICE | Site: ABDOMEN | Status: NON-FUNCTIONAL
Brand: V-LOC 180

## (undated) DEVICE — ENDOPOUCH RETRIEVER SPECIMEN RETRIEVAL BAGS: Brand: ENDOPOUCH RETRIEVER

## (undated) DEVICE — PK LAP GYN 60

## (undated) DEVICE — BALN PTA LUTONIX DRUGCOAT LP .035 5F 6X40MM

## (undated) DEVICE — GOWN,AURORA,NOREINF,RAGLAN,XL,STERILE: Brand: MEDLINE

## (undated) DEVICE — MONOPOLAR METZENBAUM SCISSOR TIP, DISPOSABLE: Brand: MONOPOLAR METZENBAUM SCISSOR TIP, DISPOSABLE

## (undated) DEVICE — GAUZE,SPONGE,4"X4",16PLY,XRAY,STRL,LF: Brand: MEDLINE

## (undated) DEVICE — SHEET,DRAPE,53X77,STERILE: Brand: MEDLINE

## (undated) DEVICE — GLV SURG SENSICARE POLYISPRN W/ALOE PF LF 6.5 GRN STRL

## (undated) DEVICE — TBG INSUFFLATION W/PUSH ON CON: Brand: MEDLINE INDUSTRIES, INC.

## (undated) DEVICE — NDL HYPO SFTY GLD 22G 1 1/2IN

## (undated) DEVICE — CATH GUIDE SOFTVU SELECT/V HT OMNI .038 5F 65CM

## (undated) DEVICE — PINNACLE INTRODUCER SHEATH: Brand: PINNACLE

## (undated) DEVICE — SYS CLS PORTSITE CT CLOSESURE 5AND10/12

## (undated) DEVICE — SOL IRRIG NACL 1000ML

## (undated) DEVICE — PK ANGIO LF 60